# Patient Record
Sex: MALE | Race: BLACK OR AFRICAN AMERICAN | Employment: UNEMPLOYED | URBAN - METROPOLITAN AREA
[De-identification: names, ages, dates, MRNs, and addresses within clinical notes are randomized per-mention and may not be internally consistent; named-entity substitution may affect disease eponyms.]

---

## 2019-11-07 ENCOUNTER — TELEPHONE (OUTPATIENT)
Dept: ADMINISTRATIVE | Age: 69
End: 2019-11-07

## 2019-12-12 ENCOUNTER — OFFICE VISIT (OUTPATIENT)
Dept: INTERNAL MEDICINE | Age: 69
End: 2019-12-12

## 2019-12-12 ENCOUNTER — HOSPITAL ENCOUNTER (OUTPATIENT)
Age: 69
Setting detail: SPECIMEN
Discharge: HOME | End: 2019-12-12

## 2019-12-12 VITALS
WEIGHT: 181 LBS | HEART RATE: 62 BPM | SYSTOLIC BLOOD PRESSURE: 134 MMHG | DIASTOLIC BLOOD PRESSURE: 82 MMHG | BODY MASS INDEX: 26.5 KG/M2

## 2019-12-12 DIAGNOSIS — Z13.220 SCREENING FOR HYPERLIPIDEMIA: ICD-10-CM

## 2019-12-12 DIAGNOSIS — D72.819 LEUKOPENIA, UNSPECIFIED TYPE: ICD-10-CM

## 2019-12-12 DIAGNOSIS — R73.03 PREDIABETES: Primary | ICD-10-CM

## 2019-12-12 DIAGNOSIS — Z23 NEED FOR PROPHYLACTIC VACCINATION AGAINST DIPHTHERIA-TETANUS-PERTUSSIS (DTP): ICD-10-CM

## 2019-12-12 DIAGNOSIS — Z23 NEED FOR PROPHYLACTIC VACCINATION AND INOCULATION AGAINST VARICELLA: ICD-10-CM

## 2019-12-12 LAB
ABSOLUTE EOS #: <0.03 K/UL (ref 0–0.44)
ABSOLUTE IMMATURE GRANULOCYTE: <0.03 K/UL (ref 0–0.3)
ABSOLUTE LYMPH #: 0.93 K/UL (ref 1.1–3.7)
ABSOLUTE MONO #: 0.25 K/UL (ref 0.1–1.2)
ANION GAP SERPL CALCULATED.3IONS-SCNC: 11 MMOL/L (ref 9–17)
BASOPHILS # BLD: 1 % (ref 0–2)
BASOPHILS ABSOLUTE: 0.03 K/UL (ref 0–0.2)
BUN BLDV-MCNC: 12 MG/DL (ref 8–23)
BUN/CREAT BLD: ABNORMAL (ref 9–20)
CALCIUM SERPL-MCNC: 9.5 MG/DL (ref 8.6–10.4)
CHLORIDE BLD-SCNC: 100 MMOL/L (ref 98–107)
CHOLESTEROL, FASTING: 136 MG/DL
CHOLESTEROL/HDL RATIO: 3.1
CO2: 27 MMOL/L (ref 20–31)
CREAT SERPL-MCNC: 0.82 MG/DL (ref 0.7–1.2)
DIFFERENTIAL TYPE: ABNORMAL
EOSINOPHILS RELATIVE PERCENT: 1 % (ref 1–4)
GFR AFRICAN AMERICAN: >60 ML/MIN
GFR NON-AFRICAN AMERICAN: >60 ML/MIN
GFR SERPL CREATININE-BSD FRML MDRD: ABNORMAL ML/MIN/{1.73_M2}
GFR SERPL CREATININE-BSD FRML MDRD: ABNORMAL ML/MIN/{1.73_M2}
GLUCOSE BLD-MCNC: 137 MG/DL (ref 70–99)
HBA1C MFR BLD: 5.5 %
HCT VFR BLD CALC: 49 % (ref 40.7–50.3)
HDLC SERPL-MCNC: 44 MG/DL
HEMOGLOBIN: 16.7 G/DL (ref 13–17)
IMMATURE GRANULOCYTES: 0 %
LDL CHOLESTEROL: 76 MG/DL (ref 0–130)
LYMPHOCYTES # BLD: 38 % (ref 24–43)
MCH RBC QN AUTO: 30.9 PG (ref 25.2–33.5)
MCHC RBC AUTO-ENTMCNC: 34.1 G/DL (ref 28.4–34.8)
MCV RBC AUTO: 90.7 FL (ref 82.6–102.9)
MONOCYTES # BLD: 10 % (ref 3–12)
NRBC AUTOMATED: 0 PER 100 WBC
PDW BLD-RTO: 12.3 % (ref 11.8–14.4)
PLATELET # BLD: 150 K/UL (ref 138–453)
PLATELET ESTIMATE: ABNORMAL
PMV BLD AUTO: 12.9 FL (ref 8.1–13.5)
POTASSIUM SERPL-SCNC: 4.6 MMOL/L (ref 3.7–5.3)
RBC # BLD: 5.4 M/UL (ref 4.21–5.77)
RBC # BLD: ABNORMAL 10*6/UL
SEG NEUTROPHILS: 50 % (ref 36–65)
SEGMENTED NEUTROPHILS ABSOLUTE COUNT: 1.18 K/UL (ref 1.5–8.1)
SODIUM BLD-SCNC: 138 MMOL/L (ref 135–144)
TRIGLYCERIDE, FASTING: 81 MG/DL
VLDLC SERPL CALC-MCNC: NORMAL MG/DL (ref 1–30)
WBC # BLD: 2.4 K/UL (ref 3.5–11.3)
WBC # BLD: ABNORMAL 10*3/UL

## 2019-12-12 PROCEDURE — 36415 COLL VENOUS BLD VENIPUNCTURE: CPT

## 2019-12-12 PROCEDURE — 83036 HEMOGLOBIN GLYCOSYLATED A1C: CPT | Performed by: STUDENT IN AN ORGANIZED HEALTH CARE EDUCATION/TRAINING PROGRAM

## 2019-12-12 PROCEDURE — 85025 COMPLETE CBC W/AUTO DIFF WBC: CPT

## 2019-12-12 PROCEDURE — 99211 OFF/OP EST MAY X REQ PHY/QHP: CPT | Performed by: INTERNAL MEDICINE

## 2019-12-12 PROCEDURE — 99203 OFFICE O/P NEW LOW 30 MIN: CPT | Performed by: STUDENT IN AN ORGANIZED HEALTH CARE EDUCATION/TRAINING PROGRAM

## 2019-12-12 PROCEDURE — 80061 LIPID PANEL: CPT

## 2019-12-12 PROCEDURE — 80048 BASIC METABOLIC PNL TOTAL CA: CPT

## 2019-12-12 RX ORDER — ATORVASTATIN CALCIUM 40 MG/1
40 TABLET, FILM COATED ORAL DAILY
Qty: 30 TABLET | Refills: 5 | Status: SHIPPED | OUTPATIENT
Start: 2019-12-12 | End: 2020-04-30 | Stop reason: SDUPTHER

## 2020-03-12 ENCOUNTER — OFFICE VISIT (OUTPATIENT)
Dept: INTERNAL MEDICINE | Age: 70
End: 2020-03-12

## 2020-03-12 VITALS
BODY MASS INDEX: 26.36 KG/M2 | WEIGHT: 180 LBS | DIASTOLIC BLOOD PRESSURE: 72 MMHG | HEART RATE: 65 BPM | SYSTOLIC BLOOD PRESSURE: 147 MMHG

## 2020-03-12 PROBLEM — R73.03 PREDIABETES: Status: ACTIVE | Noted: 2020-03-12

## 2020-03-12 PROBLEM — D72.819 CHRONIC LEUKOPENIA: Status: ACTIVE | Noted: 2020-03-12

## 2020-03-12 PROCEDURE — 99211 OFF/OP EST MAY X REQ PHY/QHP: CPT | Performed by: INTERNAL MEDICINE

## 2020-03-12 PROCEDURE — 99213 OFFICE O/P EST LOW 20 MIN: CPT | Performed by: STUDENT IN AN ORGANIZED HEALTH CARE EDUCATION/TRAINING PROGRAM

## 2020-03-12 ASSESSMENT — PATIENT HEALTH QUESTIONNAIRE - PHQ9
2. FEELING DOWN, DEPRESSED OR HOPELESS: 0
1. LITTLE INTEREST OR PLEASURE IN DOING THINGS: 0
SUM OF ALL RESPONSES TO PHQ QUESTIONS 1-9: 0
SUM OF ALL RESPONSES TO PHQ9 QUESTIONS 1 & 2: 0
SUM OF ALL RESPONSES TO PHQ QUESTIONS 1-9: 0

## 2020-03-12 NOTE — PROGRESS NOTES
Attending Physician Statement  I have discussed the care of [facility] including pertinent history and exam findings,  with the resident. I have reviewed the key elements of all parts of the encounter with the resident. I agree with the assessment, plan and orders as documented by the resident.   (Dorothy Pierre)    Christopher Schmitz MD  Faculty Internal Medicine/ Nephrology
Diabetic visit information    BP Readings from Last 3 Encounters:   12/12/19 134/82   08/25/14 146/71   08/14/14 158/82       Hemoglobin A1C (%)   Date Value   12/12/2019 5.5   08/20/2014 6.4 (H)     LDL Cholesterol (mg/dL)   Date Value   12/12/2019 76               Have you changed or started any medications since your last visit including any over-the-counter medicines, vitamins, or herbal medicines? no   Have you stopped taking any of your medications? Is so, why? -  no  Are you having any side effects from any of your medications? - no    Have you seen any other physician or provider since your last visit?  no   Have you had any other diagnostic tests since your last visit?  no   Have you been seen in the emergency room and/or had an admission in a hospital since we last saw you?  no     Have you had your annual diabetic retinal (eye) exam? No   (ensure copy of exam is in the chart)    Have you had your routine dental cleaning in the past 6 months? no    Do you have an active MyChart account? If not, what are your barriers? No:     Patient Care Team:  Juliocesar Mckeon MD as PCP - General (Internal Medicine)  Jennifer Macias MD as Consulting Physician (Internal Medicine)    Medical history Review  Past Medical, Family, and Social History reviewed and does not contribute to the patient presenting condition.     Health Maintenance   Topic Date Due    Hepatitis C screen  1950    DTaP/Tdap/Td vaccine (1 - Tdap) 06/20/1967    Shingles Vaccine (1 of 2) 06/20/1998    Colon cancer screen colonoscopy  06/20/1998    Pneumococcal 65+ years Vaccine (1 of 1 - PPSV23) 06/20/2013    Flu vaccine (1) 09/01/2019    Lipid screen  12/12/2020    Hepatitis A vaccine  Aged Out    Hepatitis B vaccine  Aged Out    Hib vaccine  Aged Out    Meningococcal (ACWY) vaccine  Aged Out
pain  · Genitourinary:Negative for change in bladder habits, dysuria, hematuria. · Musculoskeletal: Negative for joint pain   · Neurological: Negative for headache, change in muscle strength numbness/tingling       PHYSICAL EXAM:      Vitals:    03/12/20 1543 03/12/20 1549   BP: (!) 166/69 (!) 147/72   Site: Left Upper Arm Left Upper Arm   Position: Sitting Sitting   Cuff Size: Medium Adult Medium Adult   Pulse: 66 65   Weight: 180 lb (81.6 kg)      Body mass index is 26.36 kg/m². BP Readings from Last 3 Encounters:   03/12/20 (!) 147/72   12/12/19 134/82   08/25/14 146/71        Wt Readings from Last 3 Encounters:   03/12/20 180 lb (81.6 kg)   12/12/19 181 lb (82.1 kg)   08/25/14 181 lb (82.1 kg)           · General appearance: awake, alert, cooperative  · HEENT: Head: Normocephalic, no lesions, without obvious abnormality. · Lungs: clear to auscultation bilaterally  · Heart: regular rate and rhythm, S1, S2 normal, no murmur  · Abdomen: soft, non-tender; bowel sounds normal; no masses,  no organomegaly  · Extremities: extremities normal, atraumatic, no cyanosis or edema  · Neurological:  Awake, alert, oriented to name, place and time. Cranial nerves II-XII are grossly intact. Reflexes normal and symmetric.  Sensation grossly normal  · Eye no icterus no redness    LABORATORY FINDINGS:    CBC:  Lab Results   Component Value Date    WBC 2.4 12/12/2019    HGB 16.7 12/12/2019     12/12/2019     BMP:    Lab Results   Component Value Date     12/12/2019    K 4.6 12/12/2019     12/12/2019    CO2 27 12/12/2019    BUN 12 12/12/2019    CREATININE 0.82 12/12/2019    GLUCOSE 137 12/12/2019     HEMOGLOBIN A1C:   Lab Results   Component Value Date    LABA1C 5.5 12/12/2019     MICROALBUMIN URINE: No results found for: MICROALBUR  FASTING LIPID PANEL:  Lab Results   Component Value Date    CHOL 156 08/15/2014    HDL 44 12/12/2019    TRIG 90 08/15/2014     Lab Results   Component Value Date    LDLCHOLESTEROL

## 2020-03-12 NOTE — PATIENT INSTRUCTIONS
Labs given to patient, they will have them done before their next visit. Jane Garcia     OFFICE WILL CALL PT IN AUGUST TO SCHEDULE APPOINTMENT FOR SEPT TV

## 2020-04-26 NOTE — TELEPHONE ENCOUNTER
Refill request for Metformin. If appropriate please send medication(s) to patients pharmacy. Next appt: Patient is currently on wait list for provider.     Last appt: 3/12/2020    Health Maintenance   Topic Date Due    Hepatitis C screen  1950    DTaP/Tdap/Td vaccine (1 - Tdap) 06/20/1967    Shingles Vaccine (1 of 2) 06/20/1998    Colon cancer screen colonoscopy  06/20/1998    Pneumococcal 65+ years Vaccine (1 of 1 - PPSV23) 06/20/2013    Flu vaccine (Season Ended) 09/01/2020    A1C test (Diabetic or Prediabetic)  12/12/2020    Lipid screen  12/12/2020    Hepatitis A vaccine  Aged Out    Hepatitis B vaccine  Aged Out    Hib vaccine  Aged Out    Meningococcal (ACWY) vaccine  Aged Out       Hemoglobin A1C (%)   Date Value   12/12/2019 5.5   08/20/2014 6.4 (H)             ( goal A1C is < 7)   No results found for: LABMICR  LDL Cholesterol (mg/dL)   Date Value   12/12/2019 76       (goal LDL is <100)   ALT (U/L)   Date Value   08/15/2014 14     BUN (mg/dL)   Date Value   12/12/2019 12     BP Readings from Last 3 Encounters:   03/12/20 (!) 147/72   12/12/19 134/82   08/25/14 146/71          (goal 120/80)          Patient Active Problem List:     Prediabetes     Chronic leukopenia

## 2020-04-30 NOTE — TELEPHONE ENCOUNTER
Request for Lipitor. Next Visit Date:  No future appointments.     Health Maintenance   Topic Date Due    Hepatitis C screen  1950    DTaP/Tdap/Td vaccine (1 - Tdap) 06/20/1967    Shingles Vaccine (1 of 2) 06/20/1998    Colon cancer screen colonoscopy  06/20/1998    Pneumococcal 65+ years Vaccine (1 of 1 - PPSV23) 06/20/2013    Flu vaccine (Season Ended) 09/01/2020    A1C test (Diabetic or Prediabetic)  12/12/2020    Lipid screen  12/12/2020    Hepatitis A vaccine  Aged Out    Hepatitis B vaccine  Aged Out    Hib vaccine  Aged Out    Meningococcal (ACWY) vaccine  Aged Out       Hemoglobin A1C (%)   Date Value   12/12/2019 5.5   08/20/2014 6.4 (H)             ( goal A1C is < 7)   No results found for: LABMICR  LDL Cholesterol (mg/dL)   Date Value   12/12/2019 76       (goal LDL is <100)   ALT (U/L)   Date Value   08/15/2014 14     BUN (mg/dL)   Date Value   12/12/2019 12     BP Readings from Last 3 Encounters:   03/12/20 (!) 147/72   12/12/19 134/82   08/25/14 146/71          (goal 120/80)    All Future Testing planned in [facility]  Lab Frequency Next Occurrence   Vitamin D 25 Hydroxy Once 06/20/2020   Phosphorus Once 06/20/2020   Calcium, Ionized Once 06/20/2020   Immunofixation Serum Profile Once 06/20/2020   Flow Cytometry Once 06/20/2020   CBC with Differential Once 06/20/2020         Patient Active Problem List:     Prediabetes     Chronic leukopenia

## 2020-05-11 RX ORDER — ATORVASTATIN CALCIUM 40 MG/1
40 TABLET, FILM COATED ORAL DAILY
Qty: 30 TABLET | Refills: 5 | Status: SHIPPED | OUTPATIENT
Start: 2020-05-11 | End: 2021-04-07 | Stop reason: SDUPTHER

## 2020-06-16 NOTE — TELEPHONE ENCOUNTER
Request for metformin - medication pended. Please fill if appropriate. Next Visit Date:  No future appointments.     Health Maintenance   Topic Date Due    Hepatitis C screen  1950    DTaP/Tdap/Td vaccine (1 - Tdap) 06/20/1967    Shingles Vaccine (1 of 2) 06/20/1998    Colon cancer screen colonoscopy  06/20/1998    Pneumococcal 65+ years Vaccine (1 of 1 - PPSV23) 06/20/2013    Flu vaccine (Season Ended) 09/01/2020    A1C test (Diabetic or Prediabetic)  12/12/2020    Lipid screen  12/12/2020    Hepatitis A vaccine  Aged Out    Hepatitis B vaccine  Aged Out    Hib vaccine  Aged Out    Meningococcal (ACWY) vaccine  Aged Out       Hemoglobin A1C (%)   Date Value   12/12/2019 5.5   08/20/2014 6.4 (H)             ( goal A1C is < 7)   No results found for: LABMICR  LDL Cholesterol (mg/dL)   Date Value   12/12/2019 76       (goal LDL is <100)   ALT (U/L)   Date Value   08/15/2014 14     BUN (mg/dL)   Date Value   12/12/2019 12     BP Readings from Last 3 Encounters:   03/12/20 (!) 147/72   12/12/19 134/82   08/25/14 146/71          (goal 120/80)    All Future Testing planned in [facility]  Lab Frequency Next Occurrence   Vitamin D 25 Hydroxy Once 06/20/2020   Phosphorus Once 06/20/2020   Calcium, Ionized Once 06/20/2020   Immunofixation Serum Profile Once 06/20/2020   Flow Cytometry Once 06/20/2020   CBC with Differential Once 06/20/2020         Patient Active Problem List:     Prediabetes     Chronic leukopenia

## 2020-06-29 ENCOUNTER — TELEPHONE (OUTPATIENT)
Dept: INTERNAL MEDICINE | Age: 70
End: 2020-06-29

## 2020-06-29 NOTE — LETTER
[address]  [address] [address]  Phone: [phone]  Fax: [phone]    Jocelyn Rodriguez MD        June 29, 2020 [address] [address] [address]      Dear Jemma Griffiths: We are sending this letter because your PCP ordered [facility] for you to have done at your last visit here and they have not yet been completed. If you can please come to our office on the 2nd floor to  your orders to have them compelted. If you do not have a follow-up appointment scheduled you can either contact the office to make an appointment with us or you can make one when you come in to pick-up your orders. If you have any questions or concerns, please don't hesitate to call.     Sincerely,        Jocelyn Rodriguez MD

## 2020-09-28 ENCOUNTER — HOSPITAL ENCOUNTER (OUTPATIENT)
Age: 70
Setting detail: SPECIMEN
Discharge: HOME | End: 2020-09-28

## 2020-09-28 LAB
ABSOLUTE EOS #: 0.04 K/UL (ref 0–0.44)
ABSOLUTE IMMATURE GRANULOCYTE: 0.02 K/UL (ref 0–0.3)
ABSOLUTE LYMPH #: 1 K/UL (ref 1.1–3.7)
ABSOLUTE MONO #: 0.22 K/UL (ref 0.1–1.2)
BASOPHILS # BLD: 1 % (ref 0–2)
BASOPHILS ABSOLUTE: 0.02 K/UL (ref 0–0.2)
CALCIUM IONIZED: 1.26 MMOL/L (ref 1.13–1.33)
DIFFERENTIAL TYPE: ABNORMAL
EOSINOPHILS RELATIVE PERCENT: 2 % (ref 1–4)
HCT VFR BLD CALC: 45.9 % (ref 40.7–50.3)
HEMOGLOBIN: 15.4 G/DL (ref 13–17)
IMMATURE GRANULOCYTES: 1 %
LYMPHOCYTES # BLD: 45 % (ref 24–43)
MCH RBC QN AUTO: 30.6 PG (ref 25.2–33.5)
MCHC RBC AUTO-ENTMCNC: 33.6 G/DL (ref 28.4–34.8)
MCV RBC AUTO: 91.1 FL (ref 82.6–102.9)
MONOCYTES # BLD: 10 % (ref 3–12)
MORPHOLOGY: NORMAL
NRBC AUTOMATED: 0 PER 100 WBC
PDW BLD-RTO: 13.1 % (ref 11.8–14.4)
PHOSPHORUS: 2.8 MG/DL (ref 2.5–4.5)
PLATELET # BLD: 149 K/UL (ref 138–453)
PLATELET ESTIMATE: ABNORMAL
PMV BLD AUTO: 12.5 FL (ref 8.1–13.5)
RBC # BLD: 5.04 M/UL (ref 4.21–5.77)
RBC # BLD: ABNORMAL 10*6/UL
SEG NEUTROPHILS: 41 % (ref 36–65)
SEGMENTED NEUTROPHILS ABSOLUTE COUNT: 0.9 K/UL (ref 1.5–8.1)
VITAMIN D 25-HYDROXY: 21.3 NG/ML (ref 30–100)
WBC # BLD: 2.2 K/UL (ref 3.5–11.3)
WBC # BLD: ABNORMAL 10*3/UL

## 2020-09-29 LAB
PATHOLOGIST: NORMAL
SERUM IFX INTERP: NORMAL

## 2020-10-01 LAB — SURGICAL PATHOLOGY REPORT: NORMAL

## 2020-10-02 LAB — FLOW CYTOMETRY BL: NORMAL

## 2020-10-14 ENCOUNTER — OFFICE VISIT (OUTPATIENT)
Dept: INTERNAL MEDICINE | Age: 70
End: 2020-10-14

## 2020-10-14 VITALS
WEIGHT: 181 LBS | BODY MASS INDEX: 26.5 KG/M2 | DIASTOLIC BLOOD PRESSURE: 82 MMHG | HEART RATE: 78 BPM | SYSTOLIC BLOOD PRESSURE: 174 MMHG

## 2020-10-14 PROCEDURE — 99211 OFF/OP EST MAY X REQ PHY/QHP: CPT | Performed by: INTERNAL MEDICINE

## 2020-10-14 PROCEDURE — 99213 OFFICE O/P EST LOW 20 MIN: CPT | Performed by: STUDENT IN AN ORGANIZED HEALTH CARE EDUCATION/TRAINING PROGRAM

## 2020-10-14 RX ORDER — LISINOPRIL 10 MG/1
10 TABLET ORAL DAILY
Qty: 30 TABLET | Refills: 2 | Status: SHIPPED
Start: 2020-10-14 | End: 2021-02-17 | Stop reason: SINTOL

## 2020-10-14 RX ORDER — MELATONIN
1000 DAILY
Qty: 90 TABLET | Refills: 1 | Status: SHIPPED | OUTPATIENT
Start: 2020-10-14 | End: 2021-04-07 | Stop reason: SDUPTHER

## 2020-10-14 NOTE — PROGRESS NOTES
@[facility]@    [facility]    Progress Note    Date of patient's visit: 10/14/2020    Patient's Name:  Kelton Pan    YOB: 1950            Patient Care Team:  Bronson Todd MD as PCP - General (Internal Medicine)  Ronda Smith MD as Consulting Physician (Internal Medicine)    REASON FOR VISIT: Routine outpatient follow     Chief Complaint   Patient presents with   [facility] Maintenance     flu shot given at [facility] / tdap p[place] shingles set to print no insurance          HISTORY OF PRESENT ILLNESS:    History was obtained from the patient. Kelton Pan is a 65 y.o. is here for clinic follow-up. History of prediabetes with last A1c 5.5 on metformin 100 mg once daily, atorvastatin 40 mg daily came for routine follow-up. Patient has leukopenia with last WBC count is 2.2, peripheral blood smear showed moderate neutropenia, low absolute lymphocyte count, flow cytometry was negative, lymphocyte 55%, 10% monocytes, 35% granulocytes/myeloid cells and cytometry was negative for any B-cell monoclonality or T-cell aberrancy. Patient blood pressure today is elevated systolic 480 and diastolic 80. Will recheck it again. Patient checks his blood pressure at home usually runs between systolic 815 to 806T. Advised the patient to recheck his blood pressure at home and call me back in 2 weeks    Patient found to have low vitamin D level of 21.3. Past Medical History:   Diagnosis Date    Depression        No past surgical history on file.       ALLERGIES    No Known Allergies    MEDICATIONS:      Current Outpatient Medications on File Prior to Visit   Medication Sig Dispense Refill    metFORMIN (GLUCOPHAGE) 500 MG tablet TAKE ONE TABLET BY MOUTH DAILY WITH BREAKFAST 90 tablet 0    atorvastatin (LIPITOR) 40 MG tablet Take 1 tablet by mouth daily 30 tablet 5     No current facility-administered medications on file prior to visit. SOCIAL HISTORY    Reviewed and no change from previous record. July Daley  reports that he has never smoked. He has never used smokeless tobacco.    FAMILY HISTORY:    Reviewed and No change from previous visit    HEALTH MAINTENANCE DUE:      Health Maintenance Due   Topic Date Due    Hepatitis C screen  1950    DTaP/Tdap/Td vaccine (1 - Tdap) 06/20/1967    Shingles Vaccine (1 of 2) 06/20/1998    Colon cancer screen colonoscopy  06/20/1998    Pneumococcal 65+ years Vaccine (1 of 1 - PPSV23) 06/20/2013       REVIEW OF SYSTEMS:    12 point review of symptoms completed and found to be normal except noted in the HPI    · Constitutional: Negative for Fever, chills  · Eyes: Negative for visual changes, diplopia  · ENT: Negative for mouth sores, sore throat. · Cardiovascular: Negative for lightheadedness ,chest pain, palpitations   · Respiratory:Negative for Shortness of breath,cough or wheezing. · Gastrointestinal: Negative for nausea/vomiting, change in bowel habits, abdominal pain  · Genitourinary:Negative for change in bladder habits, dysuria, hematuria. · Musculoskeletal: Negative for joint pain   · Neurological: Negative for headache, change in muscle strength numbness/tingling       PHYSICAL EXAM:      Vitals:    10/14/20 1051   BP: (!) 176/80   Site: Left Upper Arm   Position: Sitting   Cuff Size: Medium Adult   Pulse: 75   Weight: 181 lb (82.1 kg)     Body mass index is 26.5 kg/m². BP Readings from Last 3 Encounters:   10/14/20 (!) 176/80   03/12/20 (!) 147/72   12/12/19 134/82        Wt Readings from Last 3 Encounters:   10/14/20 181 lb (82.1 kg)   03/12/20 180 lb (81.6 kg)   12/12/19 181 lb (82.1 kg)           · General appearance: awake, alert, cooperative  · HEENT: Head: Normocephalic, no lesions, without obvious abnormality.   · Lungs: clear to auscultation bilaterally  · Heart: regular rate and rhythm, S1, S2 normal, no murmur  · Abdomen: soft, non-tender; bowel sounds normal; no masses,  no organomegaly  · Extremities: extremities normal, atraumatic, no cyanosis or edema  · Neurological:  Awake, alert, oriented to name, place and time. Cranial nerves II-XII are grossly intact. Reflexes normal and symmetric.  Sensation grossly normal  · Eye no icterus no redness    LABORATORY FINDINGS:    CBC:  Lab Results   Component Value Date    WBC 2.2 09/28/2020    HGB 15.4 09/28/2020     09/28/2020     BMP:    Lab Results   Component Value Date     12/12/2019    K 4.6 12/12/2019     12/12/2019    CO2 27 12/12/2019    BUN 12 12/12/2019    CREATININE 0.82 12/12/2019    GLUCOSE 137 12/12/2019     HEMOGLOBIN A1C:   Lab Results   Component Value Date    LABA1C 5.5 12/12/2019     MICROALBUMIN URINE: No results found for: MICROALBUR  FASTING LIPID PANEL:  Lab Results   Component Value Date    CHOL 156 08/15/2014    HDL 44 12/12/2019    TRIG 90 08/15/2014     Lab Results   Component Value Date    LDLCHOLESTEROL 76 12/12/2019       LIVER PROFILE:  Lab Results   Component Value Date    ALT 14 08/15/2014      THYROID FUNCTION:   Lab Results   Component Value Date    TSH 1.68 08/15/2014      URINE ANALYSIS: No results found for: LABURIN  ASSESSMENT AND PLAN:      Prediabetes  Continue metformin 500 mg daily  Continue atorvastatin 40 mg daily  Patient last A1c is 5.512/19    Elevated blood pressure  Blood pressure today systolic 349 and diastolic 80  We will recheck the blood pressure  Patient checks his home blood pressure monitoring usually runs between systolic 990 and diastolic 322  Advised the patient to recheck it for 2 weeks and call me back in 2 weeks  Will start if it persists blood pressure is elevated at his home      Need for prophylactic vaccination against Streptococcus pneumoniae (pneumococcus)    - Pneumococcal polysaccharide vaccine 23-valent PPSV23    Need for prophylactic vaccination against diphtheria-tetanus-pertussis (DTP)    - Tdap (ADACEL) 5-2-15.5 LF-MCG/0.5 injection; Inject 0.5 mLs into the muscle once for 1 dose  Dispense: 0.5 mL; Refill: 0     Need for prophylactic vaccination and inoculation against varicella    - zoster recombinant adjuvanted vaccine [facility]) 50 MCG/0.5ML SUSR injection; Inject 0.5 mLs into the muscle once for 1 dose 50 MCG IM then repeat 2-6 months. Dispense: 1 each; Refill: 1     Hypovitaminosis D    - vitamin D3 (CHOLECALCIFEROL) 25 MCG (1000 UT) TABS tablet; Take 1 tablet by mouth daily  Dispense: 90 tablet; Refill: 1          Health Maintenance      FOLLOW UP AND INSTRUCTIONS:   1. No follow-ups on file. 2. Chuck received counseling on the following healthy behaviors: exercise    3. Reviewed prior labs and health maintenance. 4. Discussed use, benefit, and side effects of prescribed medications. Barriers to medication compliance addressed. All patient questions answered. Pt voiced understanding.      5. Patient given educational materials - see patient instructions            Vivek Angel MD,  Internal Medicine Resident, PGY-3,   [address]  10/14/2020,11:22 AM

## 2020-10-14 NOTE — PATIENT INSTRUCTIONS
office will call pt in December to st up January appointment     Printed script for tdap shingles vaccines given to pt    v

## 2020-10-21 ENCOUNTER — HOSPITAL ENCOUNTER (OUTPATIENT)
Age: 70
Setting detail: SPECIMEN
Discharge: HOME | End: 2020-10-21

## 2020-10-21 LAB
ANION GAP SERPL CALCULATED.3IONS-SCNC: 12 MMOL/L (ref 9–17)
BUN BLDV-MCNC: 17 MG/DL (ref 8–23)
BUN/CREAT BLD: ABNORMAL (ref 9–20)
CALCIUM SERPL-MCNC: 9.3 MG/DL (ref 8.6–10.4)
CHLORIDE BLD-SCNC: 103 MMOL/L (ref 98–107)
CO2: 23 MMOL/L (ref 20–31)
CREAT SERPL-MCNC: 0.88 MG/DL (ref 0.7–1.2)
ESTIMATED AVERAGE GLUCOSE: 108 MG/DL
GFR AFRICAN AMERICAN: >60 ML/MIN
GFR NON-AFRICAN AMERICAN: >60 ML/MIN
GFR SERPL CREATININE-BSD FRML MDRD: ABNORMAL ML/MIN/{1.73_M2}
GFR SERPL CREATININE-BSD FRML MDRD: ABNORMAL ML/MIN/{1.73_M2}
GLUCOSE BLD-MCNC: 155 MG/DL (ref 70–99)
HBA1C MFR BLD: 5.4 % (ref 4–6)
POTASSIUM SERPL-SCNC: 4.4 MMOL/L (ref 3.7–5.3)
SODIUM BLD-SCNC: 138 MMOL/L (ref 135–144)

## 2021-02-17 ENCOUNTER — VIRTUAL VISIT (OUTPATIENT)
Dept: INTERNAL MEDICINE | Age: 71
End: 2021-02-17

## 2021-02-17 DIAGNOSIS — I10 ESSENTIAL HYPERTENSION: Primary | ICD-10-CM

## 2021-02-17 PROCEDURE — 99442 PR PHYS/QHP TELEPHONE EVALUATION 11-20 MIN: CPT | Performed by: INTERNAL MEDICINE

## 2021-02-17 RX ORDER — LISINOPRIL 2.5 MG/1
2.5 TABLET ORAL DAILY
Qty: 30 TABLET | Refills: 0 | Status: SHIPPED | OUTPATIENT
Start: 2021-02-17 | End: 2021-04-07 | Stop reason: SDUPTHER

## 2021-02-17 RX ORDER — LISINOPRIL 2.5 MG/1
10 TABLET ORAL DAILY
Qty: 30 TABLET | Refills: 0 | Status: CANCELLED | OUTPATIENT
Start: 2021-02-17

## 2021-02-17 SDOH — ECONOMIC STABILITY: FOOD INSECURITY: WITHIN THE PAST 12 MONTHS, YOU WORRIED THAT YOUR FOOD WOULD RUN OUT BEFORE YOU GOT MONEY TO BUY MORE.: NEVER TRUE

## 2021-02-17 SDOH — ECONOMIC STABILITY: TRANSPORTATION INSECURITY
IN THE PAST 12 MONTHS, HAS LACK OF TRANSPORTATION KEPT YOU FROM MEETINGS, WORK, OR FROM GETTING THINGS NEEDED FOR DAILY LIVING?: NO

## 2021-02-17 SDOH — ECONOMIC STABILITY: FOOD INSECURITY: WITHIN THE PAST 12 MONTHS, THE FOOD YOU BOUGHT JUST DIDN'T LAST AND YOU DIDN'T HAVE MONEY TO GET MORE.: NEVER TRUE

## 2021-02-17 SDOH — ECONOMIC STABILITY: INCOME INSECURITY: HOW HARD IS IT FOR YOU TO PAY FOR THE VERY BASICS LIKE FOOD, HOUSING, MEDICAL CARE, AND HEATING?: NOT VERY HARD

## 2021-02-17 ASSESSMENT — PATIENT HEALTH QUESTIONNAIRE - PHQ9
1. LITTLE INTEREST OR PLEASURE IN DOING THINGS: 0
SUM OF ALL RESPONSES TO PHQ QUESTIONS 1-9: 0
2. FEELING DOWN, DEPRESSED OR HOPELESS: 0

## 2021-02-17 NOTE — PROGRESS NOTES
Attending Physician Statement  I have discussed the care of [facility], including pertinent history and exam findings with the resident. I have reviewed the key elements of all parts of the encounter with the resident. I agree with the assessment, and status of the problem list as documented. Diagnosis Orders   1. Essential hypertension  lisinopril (PRINIVIL;ZESTRIL) 2.5 MG tablet     The plan and orders should include No orders of the defined types were placed in this encounter. and this was also documented by the resident. The medication list was reviewed with the resident and is up to date. The return visit should be in 3 months .     Dr Sabino Eubanks MD, [address]  Associate , Department of Internal Medicine  Resident Ambulatory Site Medical Director  [address] [facility] Internal Medicine  [facility] AT [place]  Internal Medicine Clerkship - Prabhjot Lindo    2/17/2021, 2:11 PM
I affirm this is a Patient Initiated Episode with a Patient who has not had a related appointment within my department in the past 7 days or scheduled within the next 24 hours.     Patient identification was verified at the start of the visit: Yes    Total Time:10-15 min    Note: not billable if this call serves to triage the patient into an appointment for the relevant concern      Gloria Gerber

## 2021-02-17 NOTE — PATIENT INSTRUCTIONS
Follow-up appointment scheduled for 3/17/21 at [facility] LORETO given to patient. Medications e-scribe to pharmacy of pt's choice.      enrico

## 2021-02-17 NOTE — TELEPHONE ENCOUNTER
Pt requesting medication refill, metformin  Next Visit Date:2/17/21  Future Appointments   Date Time Provider Blair Bowmani   3/17/2021  9:00 AM Sarah Gomez MD [address] [facility]   Topic Date Due    Hepatitis C screen  1950    DTaP/Tdap/Td vaccine (1 - Tdap) 06/20/1967    Shingles Vaccine (1 of 2) 06/20/1998    Colon cancer screen colonoscopy  06/20/1998    Pneumococcal 65+ years Vaccine (1 of 1 - PPSV23) 06/20/2013    Lipid screen  12/12/2020    COVID-19 Vaccine (2 of 2 - Pfizer series) 03/06/2021    A1C test (Diabetic or Prediabetic)  10/21/2021    Potassium monitoring  10/21/2021    Creatinine monitoring  10/21/2021    Flu vaccine  Completed    Hepatitis A vaccine  Aged Out    Hepatitis B vaccine  Aged Out    Hib vaccine  Aged Out    Meningococcal (ACWY) vaccine  Aged Out       Hemoglobin A1C (%)   Date Value   10/21/2020 5.4   12/12/2019 5.5   08/20/2014 6.4 (H)             ( goal A1C is < 7)   No results found for: LABMICR  LDL Cholesterol (mg/dL)   Date Value   12/12/2019 76   08/15/2014 93       (goal LDL is <100)   ALT (U/L)   Date Value   08/15/2014 14     BUN (mg/dL)   Date Value   10/21/2020 17     BP Readings from Last 3 Encounters:   10/14/20 (!) 174/82   03/12/20 (!) 147/72   12/12/19 134/82          (goal 120/80)    All Future Testing planned in [facility]  Lab Frequency Next Occurrence               Patient Active Problem List:     Prediabetes     Chronic leukopenia

## 2021-03-16 ENCOUNTER — TELEPHONE (OUTPATIENT)
Dept: INTERNAL MEDICINE | Age: 71
End: 2021-03-16

## 2021-03-16 NOTE — TELEPHONE ENCOUNTER
PT has an appointment on 4/7/21 at [facility] and will need an  Turkish or [address]. Appointment was originally scheduled for 3/17/21. Thank you.

## 2021-04-07 ENCOUNTER — OFFICE VISIT (OUTPATIENT)
Dept: INTERNAL MEDICINE | Age: 71
End: 2021-04-07

## 2021-04-07 VITALS
HEIGHT: 69 IN | SYSTOLIC BLOOD PRESSURE: 135 MMHG | WEIGHT: 170 LBS | HEART RATE: 57 BPM | BODY MASS INDEX: 25.18 KG/M2 | DIASTOLIC BLOOD PRESSURE: 71 MMHG

## 2021-04-07 DIAGNOSIS — R73.03 PREDIABETES: ICD-10-CM

## 2021-04-07 DIAGNOSIS — E55.9 HYPOVITAMINOSIS D: ICD-10-CM

## 2021-04-07 DIAGNOSIS — I10 ESSENTIAL HYPERTENSION: Primary | ICD-10-CM

## 2021-04-07 PROCEDURE — 99211 OFF/OP EST MAY X REQ PHY/QHP: CPT | Performed by: INTERNAL MEDICINE

## 2021-04-07 PROCEDURE — 99213 OFFICE O/P EST LOW 20 MIN: CPT | Performed by: STUDENT IN AN ORGANIZED HEALTH CARE EDUCATION/TRAINING PROGRAM

## 2021-04-07 RX ORDER — ATORVASTATIN CALCIUM 40 MG/1
40 TABLET, FILM COATED ORAL DAILY
Qty: 90 TABLET | Refills: 3 | Status: SHIPPED | OUTPATIENT
Start: 2021-04-07

## 2021-04-07 RX ORDER — MELATONIN
1000 DAILY
Qty: 90 TABLET | Refills: 3 | Status: SHIPPED | OUTPATIENT
Start: 2021-04-07

## 2021-04-07 RX ORDER — LISINOPRIL 2.5 MG/1
2.5 TABLET ORAL DAILY
Qty: 90 TABLET | Refills: 1 | Status: SHIPPED | OUTPATIENT
Start: 2021-04-07

## 2021-04-07 NOTE — PATIENT INSTRUCTIONS
-Pt due for 6 month f/u in October-- pt to call in September to set up an appt--reminder in [facility] to contact patient as well--AVS given to patient    -Metformin dc'ed at pharmacy     -DAVI Menard

## 2021-04-07 NOTE — PROGRESS NOTES
@[facility]@    [facility]    Progress Note    Date of patient's visit: 4/7/2021    Patient's Name:  Miguel Cadet    YOB: 1950            Patient Care Team:  Regi Rehman MD as PCP - General (Internal Medicine)  Fabian Coates MD as Consulting Physician (Internal Medicine)    REASON FOR VISIT: Routine outpatient follow     Chief Complaint   Patient presents with    Hypertension     BP Check     Health Maintenance         HISTORY OF PRESENT ILLNESS:    History was obtained from the patient. Miguel Cadet is a 65 y.o. is here for follow-up for hypertension. A 58-year-old male with past medical history of prediabetes with last A1c 5.4 on 10/21/2020 on Metformin 500 mg once daily, on atorvastatin 40 mg daily with last LDL 76 on 12/12/2019, hypertension on lisinopril 2.5 mg once daily, benign ethnic neutropenia/leukopenia evaluated in the past with negative flow cytometry and immunofixation serum profile is here for follow-up for blood pressure. Patient initial blood pressure was 1 6170 5 repeat blood pressure was systolic 598 and diastolic 71 and pulse 57. Patient is not complaining of dizzy symptoms. Patient is taking his medication regularly. No other symptoms.     Serum creatinine 0.88 on 10/21/2020  Patient last WBC is 2.2 on 9/28/2020, moderate neutropenia with neutrophil Count 901/mm³      Past Medical History:   Diagnosis Date    Depression        No past surgical history on file.       ALLERGIES    No Known Allergies    MEDICATIONS:      Current Outpatient Medications on File Prior to Visit   Medication Sig Dispense Refill    lisinopril (PRINIVIL;ZESTRIL) 2.5 MG tablet Take 1 tablet by mouth daily 30 tablet 0    metFORMIN (GLUCOPHAGE) 500 MG tablet Take 1 tablet by mouth daily (with breakfast) 90 tablet 3    vitamin D3 (CHOLECALCIFEROL) 25 MCG (1000 UT) TABS tablet Take 1 tablet by mouth daily 90 tablet 1    normal, no murmur  · Abdomen: soft, non-tender; bowel sounds normal; no masses,  no organomegaly  · Extremities: extremities normal, atraumatic, no cyanosis or edema  · Neurological: Monofilament test was done in the dorsal and plantar aspect of both feet, no peripheral neuropathy. .  · Eye no icterus no redness    LABORATORY FINDINGS:    CBC:  Lab Results   Component Value Date    WBC 2.2 09/28/2020    HGB 15.4 09/28/2020     09/28/2020     BMP:    Lab Results   Component Value Date     10/21/2020    K 4.4 10/21/2020     10/21/2020    CO2 23 10/21/2020    BUN 17 10/21/2020    CREATININE 0.88 10/21/2020    GLUCOSE 155 10/21/2020     HEMOGLOBIN A1C:   Lab Results   Component Value Date    LABA1C 5.4 10/21/2020     MICROALBUMIN URINE: No results found for: MICROALBUR  FASTING LIPID PANEL:  Lab Results   Component Value Date    CHOL 156 08/15/2014    HDL 44 12/12/2019    TRIG 90 08/15/2014     Lab Results   Component Value Date    LDLCHOLESTEROL 76 12/12/2019       LIVER PROFILE:  Lab Results   Component Value Date    ALT 14 08/15/2014      THYROID FUNCTION:   Lab Results   Component Value Date    TSH 1.68 08/15/2014      URINE ANALYSIS: No results found for: LABURIN  ASSESSMENT AND PLAN:    There are no diagnoses linked to this encounter. Essential hypertension  Continue lisinopril 2.5 mg once daily  Educated regarding the salt restriction    Prediabetes  Last A1c of 5.4 on 10/20  Will discontinue Metformin  Educated the patient  Monofilament test shows no peripheral neuropathy  Advised the patient to see eye doctor, has a history of cataract    All refills given. Patient has insurance issues, unable to do any routine screening. Health Maintenance      FOLLOW UP AND INSTRUCTIONS:   1. No follow-ups on file. 2. Chuck received counseling on the following healthy behaviors: medication adherence    3. Reviewed prior labs and health maintenance.       4. Discussed use, benefit, and side effects of prescribed medications. Barriers to medication compliance addressed. All patient questions answered. Pt voiced understanding.      5. Patient given educational materials - see patient instructions         Kristen Parikh MD,  Internal Medicine Resident, PGY-3,   [address]  4/7/2021,9:48 AM

## 2022-02-02 ENCOUNTER — TELEPHONE (OUTPATIENT)
Dept: INTERNAL MEDICINE | Age: 72
End: 2022-02-02

## 2022-02-02 NOTE — TELEPHONE ENCOUNTER
Pt. daughter is calling to see if there is a test to see if her dad has the  Covid antibody. after catching it   Please Advise

## 2023-04-07 PROBLEM — E78.5 HYPERLIPIDEMIA: Status: ACTIVE | Noted: 2023-04-07

## 2023-04-07 PROBLEM — M72.2 PLANTAR FASCIITIS OF RIGHT FOOT: Status: ACTIVE | Noted: 2023-04-07

## 2023-04-07 PROBLEM — E11.9 TYPE 2 DIABETES MELLITUS WITHOUT COMPLICATION, WITHOUT LONG-TERM CURRENT USE OF INSULIN (HCC): Status: ACTIVE | Noted: 2020-03-12

## 2023-04-07 PROBLEM — I10 ESSENTIAL HYPERTENSION: Status: ACTIVE | Noted: 2023-04-07

## 2023-05-12 ENCOUNTER — OFFICE VISIT (OUTPATIENT)
Dept: INTERNAL MEDICINE | Age: 73
End: 2023-05-12

## 2023-05-12 VITALS
SYSTOLIC BLOOD PRESSURE: 142 MMHG | BODY MASS INDEX: 25.28 KG/M2 | WEIGHT: 176.6 LBS | TEMPERATURE: 97.9 F | OXYGEN SATURATION: 98 % | HEART RATE: 71 BPM | DIASTOLIC BLOOD PRESSURE: 70 MMHG | HEIGHT: 70 IN

## 2023-05-12 DIAGNOSIS — I10 ESSENTIAL HYPERTENSION: Primary | ICD-10-CM

## 2023-05-12 DIAGNOSIS — E11.9 TYPE 2 DIABETES MELLITUS WITHOUT COMPLICATION, WITHOUT LONG-TERM CURRENT USE OF INSULIN (HCC): ICD-10-CM

## 2023-05-12 DIAGNOSIS — E78.5 HYPERLIPIDEMIA, UNSPECIFIED HYPERLIPIDEMIA TYPE: ICD-10-CM

## 2023-05-12 LAB
CHP ED QC CHECK: YES
GLUCOSE BLD-MCNC: 144 MG/DL

## 2023-05-12 PROCEDURE — 3077F SYST BP >= 140 MM HG: CPT

## 2023-05-12 PROCEDURE — 3044F HG A1C LEVEL LT 7.0%: CPT

## 2023-05-12 PROCEDURE — 1123F ACP DISCUSS/DSCN MKR DOCD: CPT

## 2023-05-12 PROCEDURE — 3078F DIAST BP <80 MM HG: CPT

## 2023-05-12 PROCEDURE — 99213 OFFICE O/P EST LOW 20 MIN: CPT

## 2023-05-12 RX ORDER — LISINOPRIL 2.5 MG/1
2.5 TABLET ORAL DAILY
Qty: 30 TABLET | Refills: 5 | Status: SHIPPED | OUTPATIENT
Start: 2023-05-12

## 2023-05-12 RX ORDER — BLOOD PRESSURE TEST KIT-WRIST
KIT MISCELLANEOUS
Qty: 1 EACH | Refills: 0 | Status: SHIPPED | OUTPATIENT
Start: 2023-05-12

## 2023-05-12 RX ORDER — ATORVASTATIN CALCIUM 20 MG/1
20 TABLET, FILM COATED ORAL DAILY
Qty: 30 TABLET | Refills: 5 | Status: SHIPPED | OUTPATIENT
Start: 2023-05-12

## 2023-05-12 ASSESSMENT — ENCOUNTER SYMPTOMS
COUGH: 0
ABDOMINAL PAIN: 0
CHEST TIGHTNESS: 0
ABDOMINAL DISTENTION: 0
STRIDOR: 0
DIARRHEA: 0
WHEEZING: 0
NAUSEA: 0

## 2023-05-12 NOTE — PROGRESS NOTES
[facility]  [facility] [address] [address]  Dept: [phone]  Dept Fax: [phone]    Office Progress/Follow Up Note  Date of patient's visit: 5/12/2023  Patient's Name:  Alli Lamas YOB: 1950            Patient Care Team:  Thiago Cruz MD as PCP - General (Internal Medicine)  Isael Law MD as Consulting Physician (Internal Medicine)  ________________________________________________________________________      Reason for Visit: Routine outpatient follow up diabetes mellitus and hypertension  ________________________________________________________________________  Chief Complaint:  Diabetes (Pt took medication today, pt want to discuss lorsartan) and Hypertension    ________________________________________________________________________  History of Presenting Illness:  History was obtained from: patient, electronic medical record. Alli Lamas is a 74 y.o. is here for:    Hypertension follow-up. Diabetes follow-up    Patient was recently seen in the office and found to have elevated blood sugar  and high readings at home. His POC A1c was 5.9 and lab A1c was 6.0. His fasting blood sugar at home remain 1 20-1 30s. He denies any symptoms, polyuria or polydipsia. And currently takes metformin 500 mg twice daily. He was started on losartan 25 mg daily but patient has noticed dizziness and stopped taking it. He started taking lisinopril, dose unknown. Patient mentioned that he tolerated well lisinopril and would like to go back on lisinopril instead of losartan. Other than that, patient does not have any symptoms and has been doing pretty well. Initial blood pressure reading was 170/83 but subsequent reading is 142/70. Similar kind of readings were noted in the previous visit consistent with whitecoat hypertension.       Patient Active Problem List   Diagnosis    Type 2 diabetes mellitus without

## 2023-05-12 NOTE — PROGRESS NOTES
Attending Physician Statement  I have discussed the care of [facility], including pertinent history and exam findings,  with the resident. I have reviewed the key elements of all parts of the encounter with the resident. I agree with the assessment, plan and orders as documented by the resident.   (GE Modifier)

## 2023-05-23 ENCOUNTER — OFFICE VISIT (OUTPATIENT)
Dept: INTERNAL MEDICINE | Age: 73
End: 2023-05-23

## 2023-05-23 VITALS
DIASTOLIC BLOOD PRESSURE: 70 MMHG | TEMPERATURE: 99.1 F | BODY MASS INDEX: 25.6 KG/M2 | OXYGEN SATURATION: 100 % | HEIGHT: 70 IN | HEART RATE: 74 BPM | SYSTOLIC BLOOD PRESSURE: 140 MMHG | WEIGHT: 178.8 LBS

## 2023-05-23 DIAGNOSIS — H93.233 HYPERACUSIS OF BOTH EARS: Primary | ICD-10-CM

## 2023-05-23 DIAGNOSIS — H93.13 BILATERAL TINNITUS: ICD-10-CM

## 2023-05-23 PROCEDURE — 99213 OFFICE O/P EST LOW 20 MIN: CPT | Performed by: INTERNAL MEDICINE

## 2023-05-23 PROCEDURE — 3078F DIAST BP <80 MM HG: CPT | Performed by: INTERNAL MEDICINE

## 2023-05-23 PROCEDURE — 3077F SYST BP >= 140 MM HG: CPT | Performed by: INTERNAL MEDICINE

## 2023-05-23 PROCEDURE — 1123F ACP DISCUSS/DSCN MKR DOCD: CPT | Performed by: INTERNAL MEDICINE

## 2023-05-23 RX ORDER — LOSARTAN POTASSIUM 25 MG/1
25 TABLET ORAL DAILY
COMMUNITY
End: 2023-05-23 | Stop reason: CLARIF

## 2023-05-23 RX ORDER — AMITRIPTYLINE HYDROCHLORIDE 10 MG/1
10 TABLET, FILM COATED ORAL NIGHTLY
Qty: 30 TABLET | Refills: 0 | Status: SHIPPED | OUTPATIENT
Start: 2023-05-23

## 2023-05-23 ASSESSMENT — ENCOUNTER SYMPTOMS
ALLERGIC/IMMUNOLOGIC NEGATIVE: 1
RESPIRATORY NEGATIVE: 1
EYES NEGATIVE: 1
GASTROINTESTINAL NEGATIVE: 1

## 2023-05-23 NOTE — PROGRESS NOTES
[facility]   Progress Note        Date of patient's visit: 5/23/2023  Patient's Name:  Stephanie Alejandre                   YOB: 1950        PCP:  MD Timothy Dunn Hui Mccarty is a 74 y.o. male who presents for No chief complaint on file. and follow up of chronic medical problems. Patient Active Problem List   Diagnosis    Type 2 diabetes mellitus without complication, without long-term current use of insulin (HCC)    Chronic leukopenia    Hyperlipidemia    Plantar fasciitis of right foot    Essential hypertension       HISTORY OF PRESENT ILLNESS:    History was obtained from the patient. Hearing sensitivity and tinnitus  Patient presents today with complaints of hearing issues. Concern regarding hearing has been present for 3 weeks. He has not failed a prior hearing test.  The patient reports starting losartan and noticing noises and wind causing sensitivity to ears and a constant ringing. There is not a history of otitis media. There is not a history of recent URI, dizziness, sinus or allergy issues. Patient's allergies, medications, past medical, surgical, social and family histories were reviewed and updated as appropriate.     ALLERGIES    No Known Allergies      MEDICATIONS:      Current Outpatient Medications   Medication Sig Dispense Refill    atorvastatin (LIPITOR) 20 MG tablet Take 1 tablet by mouth daily 30 tablet 5    lisinopril (PRINIVIL;ZESTRIL) 2.5 MG tablet Take 1 tablet by mouth daily 30 tablet 5    metFORMIN (GLUCOPHAGE) 500 MG tablet Take 2 tablets by mouth 2 times daily (with meals) 60 tablet 3    Blood Pressure Monitoring (3 SERIES BP MONITOR/WRIST) JACINTO Check BP 2/day 1 each 0    vitamin D3 (CHOLECALCIFEROL) 25 MCG (1000 UT) TABS tablet Take 1 tablet by mouth daily 90 tablet 3    ibuprofen (ADVIL;MOTRIN) 600 MG tablet Take 1 tablet by mouth every 8 hours as needed for Pain or Fever 60 tablet      No current facility-administered

## 2023-05-30 DIAGNOSIS — E11.9 TYPE 2 DIABETES MELLITUS WITHOUT COMPLICATION, WITHOUT LONG-TERM CURRENT USE OF INSULIN (HCC): ICD-10-CM

## 2023-05-30 NOTE — TELEPHONE ENCOUNTER
Received fax request from pharmacy that insurance company is requesting 90 day supply of Metformin. Medication pended.

## 2023-06-09 ENCOUNTER — OFFICE VISIT (OUTPATIENT)
Dept: INTERNAL MEDICINE | Age: 73
End: 2023-06-09

## 2023-06-09 VITALS
HEART RATE: 67 BPM | BODY MASS INDEX: 25.45 KG/M2 | WEIGHT: 177.8 LBS | TEMPERATURE: 97.3 F | HEIGHT: 70 IN | DIASTOLIC BLOOD PRESSURE: 60 MMHG | OXYGEN SATURATION: 99 % | SYSTOLIC BLOOD PRESSURE: 134 MMHG

## 2023-06-09 DIAGNOSIS — Z23 NEED FOR PNEUMOCOCCAL VACCINE: ICD-10-CM

## 2023-06-09 DIAGNOSIS — I10 ESSENTIAL HYPERTENSION: ICD-10-CM

## 2023-06-09 DIAGNOSIS — Z12.11 COLON CANCER SCREENING: ICD-10-CM

## 2023-06-09 DIAGNOSIS — E11.9 TYPE 2 DIABETES MELLITUS WITHOUT COMPLICATION, WITHOUT LONG-TERM CURRENT USE OF INSULIN (HCC): Primary | ICD-10-CM

## 2023-06-09 LAB
CHP ED QC CHECK: ABNORMAL
GLUCOSE BLD-MCNC: 150 MG/DL

## 2023-06-09 PROCEDURE — 1123F ACP DISCUSS/DSCN MKR DOCD: CPT

## 2023-06-09 PROCEDURE — 90677 PCV20 VACCINE IM: CPT | Performed by: INTERNAL MEDICINE

## 2023-06-09 PROCEDURE — 82962 GLUCOSE BLOOD TEST: CPT

## 2023-06-09 PROCEDURE — 99213 OFFICE O/P EST LOW 20 MIN: CPT

## 2023-06-09 PROCEDURE — 3078F DIAST BP <80 MM HG: CPT

## 2023-06-09 PROCEDURE — 3075F SYST BP GE 130 - 139MM HG: CPT

## 2023-06-09 PROCEDURE — 3044F HG A1C LEVEL LT 7.0%: CPT

## 2023-06-09 RX ORDER — LISINOPRIL 2.5 MG/1
2.5 TABLET ORAL DAILY
Qty: 30 TABLET | Refills: 5 | Status: SHIPPED | OUTPATIENT
Start: 2023-06-09

## 2023-06-09 ASSESSMENT — ENCOUNTER SYMPTOMS
SHORTNESS OF BREATH: 0
STRIDOR: 0
COUGH: 0
WHEEZING: 0

## 2023-06-09 NOTE — PROGRESS NOTES
Attending Physician Statement  I have discussed the care of [facility], including pertinent history and exam findings,  with the resident. I have reviewed the key elements of all parts of the encounter with the resident. I agree with the assessment, plan and orders as documented by the resident.   (GE Modifier)
Glucose    ________________________________________________________________________  Follow up and instructions: Follow up in 6 months for routine follow-up. Chuck received counseling on the following healthy behaviors: medication compliance, diet and exercise    Discussed use, benefit, and side effects of prescribed medications. Barriers to medication compliance addressed. All patient questions answered. Pt voiced understanding. Patient given educational materials - see patient instructions    Linwood Avelar MD  Internal Medicine Resident, PGY-2  [address]  6/9/2023,10:26 AM          This note is created with the assistance of a speech-recognition program. While intending to generate a document that actually reflects the content of the visit, the document can still have some mistakes which may not have been identified and corrected by editing.

## 2023-06-26 DIAGNOSIS — E11.9 TYPE 2 DIABETES MELLITUS WITHOUT COMPLICATION, WITHOUT LONG-TERM CURRENT USE OF INSULIN (HCC): ICD-10-CM

## 2023-08-08 ENCOUNTER — TELEPHONE (OUTPATIENT)
Dept: SURGERY | Age: 73
End: 2023-08-08

## 2023-08-28 ENCOUNTER — TELEPHONE (OUTPATIENT)
Dept: SURGERY | Age: 73
End: 2023-08-28

## 2023-08-29 RX ORDER — POLYETHYLENE GLYCOL 3350, SODIUM SULFATE ANHYDROUS, SODIUM BICARBONATE, SODIUM CHLORIDE, POTASSIUM CHLORIDE 236; 22.74; 6.74; 5.86; 2.97 G/4L; G/4L; G/4L; G/4L; G/4L
POWDER, FOR SOLUTION ORAL
Qty: 1 EACH | Refills: 0 | Status: SHIPPED | OUTPATIENT
Start: 2023-08-29

## 2023-09-29 DIAGNOSIS — H93.233 HYPERACUSIS OF BOTH EARS: ICD-10-CM

## 2023-09-29 DIAGNOSIS — H93.13 BILATERAL TINNITUS: ICD-10-CM

## 2023-09-29 DIAGNOSIS — E78.5 HYPERLIPIDEMIA, UNSPECIFIED HYPERLIPIDEMIA TYPE: ICD-10-CM

## 2023-09-29 NOTE — TELEPHONE ENCOUNTER
Request for Lipitor. Please review and e-scribe to pharmacy listed in chart if appropriate. Thank you.       Next Visit Date: 12/15/2023  Last Visit Date: 6/9/2023    Future Appointments   Date Time Provider [address]   12/15/2023 10:10 AM Mercedes Umaña MD [address] Maintenance   Topic Date Due    Diabetic retinal exam  Never done    DTaP/Tdap/Td vaccine (1 - Tdap) Never done    Colorectal Cancer Screen  Never done    Shingles vaccine (1 of 2) Never done    Hepatitis B vaccine (1 of 3 - Risk 3-dose series) Never done    COVID-19 Vaccine (4 - Pfizer series) 04/28/2023    Flu vaccine (1) 08/01/2023    Depression Screen  04/07/2024    A1C test (Diabetic or Prediabetic)  04/10/2024    Lipids  04/10/2024    Diabetic foot exam  05/12/2024    Pneumococcal 65+ years Vaccine  Completed    Hepatitis C screen  Completed    Hepatitis A vaccine  Aged Out    Hib vaccine  Aged Out    Meningococcal (ACWY) vaccine  Aged Out    GFR test (Diabetes, CKD 3-4, OR last GFR 15-59)  Discontinued       Hemoglobin A1C (%)   Date Value   04/10/2023 6.0   04/07/2023 5.9   10/21/2020 5.4             ( goal A1C is < 7)   No components found for: \"LABMICR\"  LDL Cholesterol (mg/dL)   Date Value   04/10/2023 36       (goal LDL is <100)   ALT (U/L)   Date Value   08/15/2014 14     BUN (mg/dL)   Date Value   04/10/2023 16     BP Readings from Last 3 Encounters:   06/09/23 134/60   05/23/23 (!) 140/70   05/12/23 (!) 142/70          (goal 120/80)    All Future Testing planned in [facility]  Lab Frequency Next Occurrence         Patient Active Problem List:     Type 2 diabetes mellitus without complication, without long-term current use of insulin (HCC)     Chronic leukopenia     Hyperlipidemia     Plantar fasciitis of right foot     Essential hypertension

## 2023-09-29 NOTE — TELEPHONE ENCOUNTER
Request for Elevil. Please review and e-scribe to pharmacy listed in chart if appropriate. Thank you.       Next Visit Date: 12/15/2023  Last Visit Date: 6/9/2023    Future Appointments   Date Time Provider [address]   12/15/2023 10:10 AM Jose Angel Strong MD [address] Maintenance   Topic Date Due    Diabetic retinal exam  Never done    DTaP/Tdap/Td vaccine (1 - Tdap) Never done    Colorectal Cancer Screen  Never done    Shingles vaccine (1 of 2) Never done    Hepatitis B vaccine (1 of 3 - Risk 3-dose series) Never done    COVID-19 Vaccine (4 - Pfizer series) 04/28/2023    Flu vaccine (1) 08/01/2023    Depression Screen  04/07/2024    A1C test (Diabetic or Prediabetic)  04/10/2024    Lipids  04/10/2024    Diabetic foot exam  05/12/2024    Pneumococcal 65+ years Vaccine  Completed    Hepatitis C screen  Completed    Hepatitis A vaccine  Aged Out    Hib vaccine  Aged Out    Meningococcal (ACWY) vaccine  Aged Out    GFR test (Diabetes, CKD 3-4, OR last GFR 15-59)  Discontinued       Hemoglobin A1C (%)   Date Value   04/10/2023 6.0   04/07/2023 5.9   10/21/2020 5.4             ( goal A1C is < 7)   No components found for: \"LABMICR\"  LDL Cholesterol (mg/dL)   Date Value   04/10/2023 36       (goal LDL is <100)   ALT (U/L)   Date Value   08/15/2014 14     BUN (mg/dL)   Date Value   04/10/2023 16     BP Readings from Last 3 Encounters:   06/09/23 134/60   05/23/23 (!) 140/70   05/12/23 (!) 142/70          (goal 120/80)    All Future Testing planned in [facility]  Lab Frequency Next Occurrence         Patient Active Problem List:     Type 2 diabetes mellitus without complication, without long-term current use of insulin (HCC)     Chronic leukopenia     Hyperlipidemia     Plantar fasciitis of right foot     Essential hypertension

## 2023-10-02 RX ORDER — AMITRIPTYLINE HYDROCHLORIDE 10 MG/1
10 TABLET, FILM COATED ORAL
Qty: 30 TABLET | Refills: 0 | Status: SHIPPED | OUTPATIENT
Start: 2023-10-02

## 2023-10-02 RX ORDER — ATORVASTATIN CALCIUM 20 MG/1
20 TABLET, FILM COATED ORAL DAILY
Qty: 90 TABLET | Refills: 3 | Status: SHIPPED | OUTPATIENT
Start: 2023-10-02

## 2023-11-24 ENCOUNTER — TELEPHONE (OUTPATIENT)
Dept: INTERNAL MEDICINE | Age: 73
End: 2023-11-24

## 2023-11-24 NOTE — TELEPHONE ENCOUNTER
----- Message from Gloria Mccallum sent at 11/22/2023  2:32 PM EST -----  Subject: Appointment Request    Reason for Call: New Patient/New to Provider Appointment needed: New   Patient Request Appointment    QUESTIONS    Reason for appointment request? Requested Provider unavailable - Dr. Nia Jara     Additional Information for Provider? Pt is in need of a new pt, est care   appt and there are no appts available for pt.  Please contact pt's   daughter, Tim Jackson, regarding this message to schedule pt and wife.  ---------------------------------------------------------------------------  --------------  Miah Hayes SANTIAGO  [phone]; OK to leave message on voicemail  ---------------------------------------------------------------------------  --------------  SCRIPT ANSWERS

## 2023-11-28 ENCOUNTER — TELEPHONE (OUTPATIENT)
Dept: INTERNAL MEDICINE | Age: 73
End: 2023-11-28

## 2023-11-28 NOTE — TELEPHONE ENCOUNTER
PC to pt to see if able to move appt on 12/15 up to 12/1 to see PCP during [facility], no answer. Left HIPAA compliant message identifying self and nature of call, requested call back to writer, phone number given.

## 2023-12-18 PROBLEM — Z12.5 SCREENING FOR PROSTATE CANCER: Status: ACTIVE | Noted: 2023-12-18

## 2023-12-26 DIAGNOSIS — E55.9 VITAMIN D DEFICIENCY: Primary | ICD-10-CM

## 2023-12-26 DIAGNOSIS — E55.9 HYPOVITAMINOSIS D: ICD-10-CM

## 2023-12-26 RX ORDER — MELATONIN
1000 DAILY
Qty: 90 TABLET | Refills: 3 | Status: SHIPPED | OUTPATIENT
Start: 2023-12-26

## 2024-01-15 ENCOUNTER — OFFICE VISIT (OUTPATIENT)
Dept: FAMILY MEDICINE CLINIC | Age: 74
End: 2024-01-15
Payer: MEDICAID

## 2024-01-15 VITALS
BODY MASS INDEX: 26.7 KG/M2 | WEIGHT: 175.6 LBS | DIASTOLIC BLOOD PRESSURE: 70 MMHG | SYSTOLIC BLOOD PRESSURE: 142 MMHG | OXYGEN SATURATION: 99 % | TEMPERATURE: 98.5 F | HEART RATE: 70 BPM

## 2024-01-15 DIAGNOSIS — Z00.00 PHYSICAL EXAM: ICD-10-CM

## 2024-01-15 DIAGNOSIS — E11.9 TYPE 2 DIABETES MELLITUS WITHOUT COMPLICATION, WITHOUT LONG-TERM CURRENT USE OF INSULIN (HCC): Primary | ICD-10-CM

## 2024-01-15 PROCEDURE — 99387 INIT PM E/M NEW PAT 65+ YRS: CPT | Performed by: STUDENT IN AN ORGANIZED HEALTH CARE EDUCATION/TRAINING PROGRAM

## 2024-01-15 PROCEDURE — 3078F DIAST BP <80 MM HG: CPT | Performed by: STUDENT IN AN ORGANIZED HEALTH CARE EDUCATION/TRAINING PROGRAM

## 2024-01-15 PROCEDURE — 3077F SYST BP >= 140 MM HG: CPT | Performed by: STUDENT IN AN ORGANIZED HEALTH CARE EDUCATION/TRAINING PROGRAM

## 2024-01-15 ASSESSMENT — PATIENT HEALTH QUESTIONNAIRE - PHQ9
2. FEELING DOWN, DEPRESSED OR HOPELESS: 0
SUM OF ALL RESPONSES TO PHQ QUESTIONS 1-9: 0
1. LITTLE INTEREST OR PLEASURE IN DOING THINGS: 0
SUM OF ALL RESPONSES TO PHQ9 QUESTIONS 1 & 2: 0
SUM OF ALL RESPONSES TO PHQ QUESTIONS 1-9: 0

## 2024-01-15 NOTE — ASSESSMENT & PLAN NOTE
Physical exam: Stable  Diet: Advised high fiber diet of 20 gram a day by increasing fresh fruits, vegetables and nuts.  Advised decreasing trans fats in diet including red meat and processed red meats, refined carbohydrates, and sweetened beverages.   Exercise: Advised moderate intensity exercise (brisk walking) 150 minutes a week or at least 5,000-10,000 steps a day.  Sleep: Advised good sleep habits including turning tv and phone off. 6-8 hours advised.

## 2024-01-15 NOTE — PROGRESS NOTES
[facility]  [facility]  [address]  [facility] [address]     Date of Visit:  1/15/2024  Patient Name: Chuck Pierre   Patient :  1950     CHIEF COMPLAINT:     Chuck Pierre is a 73 y.o. male who presents today for an general visit to be evaluated for the following condition(s):  Chief Complaint   Patient presents with    Annual Exam       REVIEW OF SYSTEM      Review of Systems   All other systems reviewed and are negative.      HISTORY OF PRESENT ILLNESS     HPI  Diet:  Does not eat fast food. Freshely prepared food, avoids white bread. Drinks aboout 2 cups of coffee a day. Drinks about 2 liters of water a day.  He eats fruit and vegetables about 5 servings. Cooks fresh Chinese food daily  Exercise:  Usually goes to [employer] 2 times a week and [place]  and was wakiing one hour in the evening daily.   Sleep:   Sleeps about 8 hours a day, wakes up about twice a night and reads for about an hour then falls back asleep. Patient feels rested in the morning.   REVIEWED INFORMATION      No Known Allergies    Patient Active Problem List   Diagnosis    Type 2 diabetes mellitus without complication, without long-term current use of insulin (HCC)    Chronic leukopenia    Hyperlipidemia    Plantar fasciitis of right foot    Essential hypertension    Physical exam       Past Medical History:   Diagnosis Date    Depression     Diabetes mellitus (HCC)     Essential hypertension 2023    Hyperlipidemia 2023    Type 2 diabetes mellitus without complication, without long-term current use of insulin (HCC) 2020       No past surgical history on file.     Social History     Socioeconomic History    Marital status:      Spouse name: None    Number of children: None    Years of education: None    Highest education level: None   Tobacco Use    Smoking status: Never    Smokeless tobacco: Never   Substance and Sexual Activity    Alcohol use: Yes     Comment:

## 2024-02-14 PROBLEM — Z00.00 PHYSICAL EXAM: Status: RESOLVED | Noted: 2023-12-18 | Resolved: 2024-02-14

## 2024-05-30 DIAGNOSIS — I10 ESSENTIAL HYPERTENSION: ICD-10-CM

## 2024-05-30 RX ORDER — LISINOPRIL 2.5 MG/1
2.5 TABLET ORAL DAILY
Qty: 30 TABLET | Refills: 5 | Status: SHIPPED | OUTPATIENT
Start: 2024-05-30

## 2024-05-30 NOTE — TELEPHONE ENCOUNTER
..Request for   Requested Prescriptions     Pending Prescriptions Disp Refills    lisinopril (PRINIVIL;ZESTRIL) 2.5 MG tablet [Pharmacy Med Name: LISINOPRIL 2.5 MG TABLET] 30 tablet 5     Sig: TAKE ONE TABLET BY MOUTH DAILY    .      Please review and e-scribe to pharmacy listed in chart if appropriate. Thank you.      Last Visit Date: 6/9/2023  Next Visit Date: Visit date not found    Future Appointments   Date Time Provider Department Center   7/15/2024  9:00 AM Katherine [facility]res [facility] [address]  [facility]       Health Maintenance   Topic Date Due    Diabetic retinal exam  Never done    DTaP/Tdap/Td vaccine (1 - Tdap) Never done    Shingles vaccine (1 of 2) Never done    Respiratory Syncytial Virus (RSV) Pregnant or age 60 yrs+ (1 - 1-dose 60+ series) Never done    Diabetic foot exam  05/12/2024    A1C test (Diabetic or Prediabetic)  12/21/2024    Lipids  12/21/2024    Depression Screen  01/15/2025    Colorectal Cancer Screen  01/03/2027    Flu vaccine  Completed    Pneumococcal 65+ years Vaccine  Completed    COVID-19 Vaccine  Completed    Hepatitis C screen  Completed    Hepatitis A vaccine  Aged Out    Hepatitis B vaccine  Aged Out    Hib vaccine  Aged Out    Polio vaccine  Aged Out    Meningococcal (ACWY) vaccine  Aged Out    GFR test (Diabetes, CKD 3-4, OR last GFR 15-59)  Discontinued       Hemoglobin A1C (%)   Date Value   12/21/2023 5.2   04/10/2023 6.0   04/07/2023 5.9             ( goal A1C is < 7)   No components found for: \"LABMICR\"  No components found for: \"LDLCHOLESTEROL\", \"LDLCALC\"    (goal LDL is <100)   AST (U/L)   Date Value   12/21/2023 17     ALT (U/L)   Date Value   12/21/2023 20     BUN (mg/dL)   Date Value   12/21/2023 15     BP Readings from Last 3 Encounters:   01/15/24 (!) 142/70   12/18/23 (!) 142/70   06/09/23 134/60          (goal 120/80)    All Future Testing planned in [facility]  Lab Frequency Next Occurrence         Patient Active Problem List:     Type 2 diabetes mellitus without

## 2024-06-04 ENCOUNTER — OFFICE VISIT (OUTPATIENT)
Dept: FAMILY MEDICINE CLINIC | Age: 74
End: 2024-06-04
Payer: MEDICAID

## 2024-06-04 VITALS
SYSTOLIC BLOOD PRESSURE: 140 MMHG | WEIGHT: 177 LBS | OXYGEN SATURATION: 100 % | TEMPERATURE: 97.5 F | HEART RATE: 83 BPM | BODY MASS INDEX: 26.91 KG/M2 | DIASTOLIC BLOOD PRESSURE: 70 MMHG

## 2024-06-04 DIAGNOSIS — I10 ESSENTIAL HYPERTENSION: ICD-10-CM

## 2024-06-04 DIAGNOSIS — M54.50 ACUTE BILATERAL LOW BACK PAIN WITHOUT SCIATICA: Primary | ICD-10-CM

## 2024-06-04 DIAGNOSIS — E11.9 TYPE 2 DIABETES MELLITUS WITHOUT COMPLICATION, WITHOUT LONG-TERM CURRENT USE OF INSULIN (HCC): ICD-10-CM

## 2024-06-04 PROCEDURE — 1123F ACP DISCUSS/DSCN MKR DOCD: CPT

## 2024-06-04 PROCEDURE — 3077F SYST BP >= 140 MM HG: CPT

## 2024-06-04 PROCEDURE — 3078F DIAST BP <80 MM HG: CPT

## 2024-06-04 PROCEDURE — 99214 OFFICE O/P EST MOD 30 MIN: CPT

## 2024-06-04 RX ORDER — PREDNISONE 10 MG/1
10 TABLET ORAL 2 TIMES DAILY
Qty: 10 TABLET | Refills: 0 | Status: SHIPPED | OUTPATIENT
Start: 2024-06-04 | End: 2024-06-09

## 2024-06-04 RX ORDER — LIDOCAINE 50 MG/G
1 PATCH TOPICAL DAILY
Qty: 10 PATCH | Refills: 0 | Status: SHIPPED | OUTPATIENT
Start: 2024-06-04 | End: 2024-06-14

## 2024-06-04 SDOH — ECONOMIC STABILITY: HOUSING INSECURITY
IN THE LAST 12 MONTHS, WAS THERE A TIME WHEN YOU DID NOT HAVE A STEADY PLACE TO SLEEP OR SLEPT IN A SHELTER (INCLUDING NOW)?: NO

## 2024-06-04 SDOH — ECONOMIC STABILITY: INCOME INSECURITY: HOW HARD IS IT FOR YOU TO PAY FOR THE VERY BASICS LIKE FOOD, HOUSING, MEDICAL CARE, AND HEATING?: NOT HARD AT ALL

## 2024-06-04 SDOH — ECONOMIC STABILITY: FOOD INSECURITY: WITHIN THE PAST 12 MONTHS, THE FOOD YOU BOUGHT JUST DIDN'T LAST AND YOU DIDN'T HAVE MONEY TO GET MORE.: NEVER TRUE

## 2024-06-04 SDOH — ECONOMIC STABILITY: FOOD INSECURITY: WITHIN THE PAST 12 MONTHS, YOU WORRIED THAT YOUR FOOD WOULD RUN OUT BEFORE YOU GOT MONEY TO BUY MORE.: NEVER TRUE

## 2024-06-04 ASSESSMENT — PATIENT HEALTH QUESTIONNAIRE - PHQ9
SUM OF ALL RESPONSES TO PHQ9 QUESTIONS 1 & 2: 0
SUM OF ALL RESPONSES TO PHQ QUESTIONS 1-9: 0
SUM OF ALL RESPONSES TO PHQ QUESTIONS 1-9: 0
2. FEELING DOWN, DEPRESSED OR HOPELESS: NOT AT ALL
1. LITTLE INTEREST OR PLEASURE IN DOING THINGS: NOT AT ALL
SUM OF ALL RESPONSES TO PHQ QUESTIONS 1-9: 0
SUM OF ALL RESPONSES TO PHQ QUESTIONS 1-9: 0

## 2024-06-04 NOTE — PROGRESS NOTES
mellitus without complication, without long-term current use of insulin (HCC)  -aware home BP and glucose readings may elevate on steroid dose      Return in about 1 week (around 6/11/2024), or if symptoms worsen or fail to improve, for f/u back pain.               An electronic signature was used to authenticate this note.    --Amanda White, CRISTIANA - CNP

## 2024-06-05 ENCOUNTER — HOSPITAL ENCOUNTER (OUTPATIENT)
Age: 74
Discharge: HOME | End: 2024-06-07
Payer: MEDICAID

## 2024-06-05 ENCOUNTER — HOSPITAL ENCOUNTER (OUTPATIENT)
Dept: GENERAL RADIOLOGY | Age: 74
Discharge: HOME | End: 2024-06-07
Payer: MEDICAID

## 2024-06-05 ENCOUNTER — TELEPHONE (OUTPATIENT)
Dept: FAMILY MEDICINE CLINIC | Age: 74
End: 2024-06-05

## 2024-06-05 DIAGNOSIS — M54.50 ACUTE BILATERAL LOW BACK PAIN WITHOUT SCIATICA: ICD-10-CM

## 2024-06-05 PROCEDURE — 72100 X-RAY EXAM L-S SPINE 2/3 VWS: CPT

## 2024-06-05 NOTE — TELEPHONE ENCOUNTER
Patient saw Luh on 06/04/24 for back pain, she wanted him to follow up with Dr. Gutierres in 1 week, can you please help with that I see nothing. You find I'll make and call patient.    Please advise

## 2024-06-14 ENCOUNTER — HOSPITAL ENCOUNTER (OUTPATIENT)
Age: 74
End: 2024-06-14
Payer: MEDICAID

## 2024-06-14 ENCOUNTER — HOSPITAL ENCOUNTER (OUTPATIENT)
Dept: GENERAL RADIOLOGY | Age: 74
End: 2024-06-14
Payer: MEDICAID

## 2024-06-14 ENCOUNTER — OFFICE VISIT (OUTPATIENT)
Dept: FAMILY MEDICINE CLINIC | Age: 74
End: 2024-06-14
Payer: MEDICAID

## 2024-06-14 VITALS
WEIGHT: 177.2 LBS | SYSTOLIC BLOOD PRESSURE: 138 MMHG | OXYGEN SATURATION: 97 % | BODY MASS INDEX: 26.86 KG/M2 | DIASTOLIC BLOOD PRESSURE: 62 MMHG | HEIGHT: 68 IN | HEART RATE: 60 BPM | TEMPERATURE: 98 F

## 2024-06-14 DIAGNOSIS — J30.9 ALLERGIC CONJUNCTIVITIS OF RIGHT EYE AND RHINITIS: ICD-10-CM

## 2024-06-14 DIAGNOSIS — M25.561 CHRONIC PAIN OF BOTH KNEES: Primary | ICD-10-CM

## 2024-06-14 DIAGNOSIS — H10.11 ALLERGIC CONJUNCTIVITIS OF RIGHT EYE AND RHINITIS: ICD-10-CM

## 2024-06-14 DIAGNOSIS — G89.29 CHRONIC PAIN OF BOTH KNEES: Primary | ICD-10-CM

## 2024-06-14 DIAGNOSIS — M25.561 CHRONIC PAIN OF BOTH KNEES: ICD-10-CM

## 2024-06-14 DIAGNOSIS — G89.29 CHRONIC PAIN OF BOTH KNEES: ICD-10-CM

## 2024-06-14 DIAGNOSIS — M25.562 CHRONIC PAIN OF BOTH KNEES: ICD-10-CM

## 2024-06-14 DIAGNOSIS — M25.562 CHRONIC PAIN OF BOTH KNEES: Primary | ICD-10-CM

## 2024-06-14 PROCEDURE — 73562 X-RAY EXAM OF KNEE 3: CPT

## 2024-06-14 PROCEDURE — 3075F SYST BP GE 130 - 139MM HG: CPT | Performed by: STUDENT IN AN ORGANIZED HEALTH CARE EDUCATION/TRAINING PROGRAM

## 2024-06-14 PROCEDURE — 3078F DIAST BP <80 MM HG: CPT | Performed by: STUDENT IN AN ORGANIZED HEALTH CARE EDUCATION/TRAINING PROGRAM

## 2024-06-14 PROCEDURE — 99214 OFFICE O/P EST MOD 30 MIN: CPT | Performed by: STUDENT IN AN ORGANIZED HEALTH CARE EDUCATION/TRAINING PROGRAM

## 2024-06-14 PROCEDURE — 1123F ACP DISCUSS/DSCN MKR DOCD: CPT | Performed by: STUDENT IN AN ORGANIZED HEALTH CARE EDUCATION/TRAINING PROGRAM

## 2024-06-14 RX ORDER — OLOPATADINE HYDROCHLORIDE 1 MG/ML
1 SOLUTION/ DROPS OPHTHALMIC 2 TIMES DAILY
Qty: 5 ML | Refills: 0 | Status: SHIPPED | OUTPATIENT
Start: 2024-06-14 | End: 2024-07-14

## 2024-06-14 NOTE — PROGRESS NOTES
[facility]  [facility]  [address] [facility] [facility] [address]     Date of Visit:  2024  Patient Name: Chuck Pierre   Patient :  1950     CHIEF COMPLAINT:     Chuck Pierre is a 73 y.o. male who presents today for an general visit to be evaluated for the following condition(s):  Chief Complaint   Patient presents with    Facial Swelling     Right eye    Leg Pain    Memory Loss     Memory has been off slightly and math has become difficult        REVIEW OF SYSTEM      Review of Systems   Psychiatric/Behavioral:  Positive for memory loss.        HISTORY OF PRESENT ILLNESS     Leg Pain     Memory Loss        Patient is a 73-year-old male with a past medical history of diabetes mellitus, hyperlipidemia, hypertension who presents to the office for follow-up.  Back pain:  Patient states that using the patches and the exercises help with his back pain no longer has any back pain.  Knee pain:  Patient has bilateral knee pain that has been getting worse over the past few years.  Patient states that his left leg locks up on occasion.  He would like to go to physical therapy and get an x-ray of his knee.  Denies any trauma.  Swollen right eye:  Patient noticed for the past few days his right eyes have been swollen itchy and painful.  He does have a history of allergies and nose congestion.  Patient also states that he has runny nose only on his left nostril that occurs when he goes from hot to cold or vice versa.  REVIEWED INFORMATION      No Known Allergies    Patient Active Problem List   Diagnosis    Type 2 diabetes mellitus without complication, without long-term current use of insulin (HCC)    Chronic leukopenia    Hyperlipidemia    Plantar fasciitis of right foot    Essential hypertension    Chronic pain of both knees    Allergic conjunctivitis of right eye and rhinitis       Past Medical History:   Diagnosis Date    Depression     Diabetes mellitus (HCC)     Essential

## 2024-06-17 ENCOUNTER — OFFICE VISIT (OUTPATIENT)
Dept: FAMILY MEDICINE CLINIC | Age: 74
End: 2024-06-17
Payer: MEDICAID

## 2024-06-17 VITALS
HEART RATE: 67 BPM | OXYGEN SATURATION: 97 % | BODY MASS INDEX: 26.67 KG/M2 | TEMPERATURE: 97.7 F | HEIGHT: 68 IN | SYSTOLIC BLOOD PRESSURE: 146 MMHG | DIASTOLIC BLOOD PRESSURE: 70 MMHG | WEIGHT: 176 LBS

## 2024-06-17 DIAGNOSIS — G47.00 INSOMNIA, UNSPECIFIED TYPE: Primary | ICD-10-CM

## 2024-06-17 DIAGNOSIS — R25.2 LEG CRAMPING: ICD-10-CM

## 2024-06-17 DIAGNOSIS — R06.83 SNORING: ICD-10-CM

## 2024-06-17 DIAGNOSIS — B35.3 TINEA PEDIS OF BOTH FEET: ICD-10-CM

## 2024-06-17 PROCEDURE — 99215 OFFICE O/P EST HI 40 MIN: CPT | Performed by: STUDENT IN AN ORGANIZED HEALTH CARE EDUCATION/TRAINING PROGRAM

## 2024-06-17 PROCEDURE — 3078F DIAST BP <80 MM HG: CPT | Performed by: STUDENT IN AN ORGANIZED HEALTH CARE EDUCATION/TRAINING PROGRAM

## 2024-06-17 PROCEDURE — 1123F ACP DISCUSS/DSCN MKR DOCD: CPT | Performed by: STUDENT IN AN ORGANIZED HEALTH CARE EDUCATION/TRAINING PROGRAM

## 2024-06-17 PROCEDURE — 3077F SYST BP >= 140 MM HG: CPT | Performed by: STUDENT IN AN ORGANIZED HEALTH CARE EDUCATION/TRAINING PROGRAM

## 2024-06-17 RX ORDER — PRENATAL VIT 91/IRON/FOLIC/DHA 28-975-200
COMBINATION PACKAGE (EA) ORAL
Qty: 42 G | Refills: 0 | Status: SHIPPED | OUTPATIENT
Start: 2024-06-17

## 2024-06-17 ASSESSMENT — SLEEP AND FATIGUE QUESTIONNAIRES
ESS TOTAL SCORE: 9
HOW LIKELY ARE YOU TO NOD OFF OR FALL ASLEEP WHILE LYING DOWN TO REST IN THE AFTERNOON WHEN CIRCUMSTANCES PERMIT: HIGH CHANCE OF DOZING
HOW LIKELY ARE YOU TO NOD OFF OR FALL ASLEEP WHILE WATCHING TV: HIGH CHANCE OF DOZING
HOW LIKELY ARE YOU TO NOD OFF OR FALL ASLEEP WHILE SITTING QUIETLY AFTER LUNCH WITHOUT ALCOHOL: WOULD NEVER DOZE
HOW LIKELY ARE YOU TO NOD OFF OR FALL ASLEEP WHILE SITTING AND TALKING TO SOMEONE: WOULD NEVER DOZE
HOW LIKELY ARE YOU TO NOD OFF OR FALL ASLEEP WHILE SITTING INACTIVE IN A PUBLIC PLACE: WOULD NEVER DOZE
HOW LIKELY ARE YOU TO NOD OFF OR FALL ASLEEP IN A CAR, WHILE STOPPED FOR A FEW MINUTES IN TRAFFIC: WOULD NEVER DOZE
HOW LIKELY ARE YOU TO NOD OFF OR FALL ASLEEP WHEN YOU ARE A PASSENGER IN A CAR FOR AN HOUR WITHOUT A BREAK: WOULD NEVER DOZE
HOW LIKELY ARE YOU TO NOD OFF OR FALL ASLEEP WHILE SITTING AND READING: HIGH CHANCE OF DOZING

## 2024-06-18 PROBLEM — R06.83 SNORING: Status: ACTIVE | Noted: 2024-06-18

## 2024-06-18 PROBLEM — R25.2 LEG CRAMPING: Status: ACTIVE | Noted: 2024-06-18

## 2024-06-18 PROBLEM — B35.3 TINEA PEDIS OF BOTH FEET: Status: ACTIVE | Noted: 2024-06-18

## 2024-06-18 PROBLEM — G47.00 INSOMNIA: Status: ACTIVE | Noted: 2024-06-18

## 2024-06-18 NOTE — ASSESSMENT & PLAN NOTE
A/P: Uncontrolled  - Patient has an issue with sleep maintenance  - Frederick score 9.  Due to his snoring, leg cramping, will suggest getting a sleep study done at the sleep center.  -MoCA-28 most rule out sleep apnea as cause of memory loss.

## 2024-06-18 NOTE — ASSESSMENT & PLAN NOTE
A/P: Uncontrolled  - Fungal elements noted on microscope evaluation  - Terbinafine cream given to patient  - Advised patient to wear white bleach socks

## 2024-06-18 NOTE — PROGRESS NOTES
[facility]  [facility]  [address]  [place] [address]     Date of Visit:  2024  Patient Name: Chuck Pierre   Patient :  1950     CHIEF COMPLAINT:     Chuck Pierre is a 73 y.o. male who presents today for an general visit to be evaluated for the following condition(s):  Chief Complaint   Patient presents with    Memory Loss     moca    Fungus        REVIEW OF SYSTEM      Review of Systems   Psychiatric/Behavioral:  Positive for memory loss.        HISTORY OF PRESENT ILLNESS     Memory Loss      Memory loss:  Patient presents with complaints of memory loss over the past few months.  Patient states that he is having a difficult time to remember details.  He has noticed a slowing in his memory.  Example it took him a few days for him to remember his great grandparents.  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  Another example patient states that he cannot remember distant acquaintance phone number which was very easy for him to remember in the past.  Patient do not forget to turn off the stove or forget his keys.  He continues to drive without any issues.  He can complete all of his ADLs without any issues.  He denies any depression, grief or anxiety.  Patient does endorse sleeping 3 hours at a time at nighttime for the past 15 years.  Patient will wake up approximately around 3 AM and stay up until 5 AM reading books.  He would then fall back asleep and sleep until 8 AM.  Patient states that he feels refreshed when he wakes up however he can fall asleep quite easily during the daytime.  He also endorses increasing nighttime leg muscle cramps.  He denies any restless legs at nighttime.  His wife does endorse him snoring quite loudly a couple times throughout the week.  Denies any apneic episodes per wife.    Foot itching:  Patient has noticed interdigit itching of his

## 2024-06-24 ENCOUNTER — TELEPHONE (OUTPATIENT)
Dept: FAMILY MEDICINE CLINIC | Age: 74
End: 2024-06-24

## 2024-06-24 NOTE — TELEPHONE ENCOUNTER
Patient daughter calling stating that since starting the eye drops patient is having blurred vision in the eye the was unaffected. The one that was swollen when he was here is ok.

## 2024-06-25 ENCOUNTER — OFFICE VISIT (OUTPATIENT)
Dept: FAMILY MEDICINE CLINIC | Age: 74
End: 2024-06-25
Payer: MEDICAID

## 2024-06-25 VITALS
TEMPERATURE: 97.5 F | HEART RATE: 67 BPM | DIASTOLIC BLOOD PRESSURE: 66 MMHG | SYSTOLIC BLOOD PRESSURE: 140 MMHG | OXYGEN SATURATION: 96 % | WEIGHT: 181.4 LBS | BODY MASS INDEX: 27.58 KG/M2

## 2024-06-25 DIAGNOSIS — H53.8 BLURRY VISION: ICD-10-CM

## 2024-06-25 DIAGNOSIS — E11.9 TYPE 2 DIABETES MELLITUS WITHOUT COMPLICATION, WITHOUT LONG-TERM CURRENT USE OF INSULIN (HCC): Primary | ICD-10-CM

## 2024-06-25 LAB — HBA1C MFR BLD: 6 %

## 2024-06-25 PROCEDURE — 1123F ACP DISCUSS/DSCN MKR DOCD: CPT | Performed by: STUDENT IN AN ORGANIZED HEALTH CARE EDUCATION/TRAINING PROGRAM

## 2024-06-25 PROCEDURE — 83036 HEMOGLOBIN GLYCOSYLATED A1C: CPT | Performed by: STUDENT IN AN ORGANIZED HEALTH CARE EDUCATION/TRAINING PROGRAM

## 2024-06-25 PROCEDURE — 3075F SYST BP GE 130 - 139MM HG: CPT | Performed by: STUDENT IN AN ORGANIZED HEALTH CARE EDUCATION/TRAINING PROGRAM

## 2024-06-25 PROCEDURE — 99215 OFFICE O/P EST HI 40 MIN: CPT | Performed by: STUDENT IN AN ORGANIZED HEALTH CARE EDUCATION/TRAINING PROGRAM

## 2024-06-25 PROCEDURE — 3078F DIAST BP <80 MM HG: CPT | Performed by: STUDENT IN AN ORGANIZED HEALTH CARE EDUCATION/TRAINING PROGRAM

## 2024-06-25 PROCEDURE — 3044F HG A1C LEVEL LT 7.0%: CPT | Performed by: STUDENT IN AN ORGANIZED HEALTH CARE EDUCATION/TRAINING PROGRAM

## 2024-06-25 NOTE — ASSESSMENT & PLAN NOTE
A/P: uncontrolled  -left pupil has hazy particulates,PERRLA, vision impaired  - no trauma or pain  -will send to opthomalogist  -stop pataday.

## 2024-06-25 NOTE — PROGRESS NOTES
Pulmonary effort is normal.   Musculoskeletal:         General: Normal range of motion.   Skin:     General: Skin is dry.   Neurological:      Mental Status: He is alert. Mental status is at baseline.   Psychiatric:         Mood and Affect: Mood normal.         Behavior: Behavior normal.     Unable to obtain IOP    ASSESSMENT/PLAN     1. Type 2 diabetes mellitus without complication, without long-term current use of insulin ([facility])  Assessment & Plan:  A/P: controlled  -hgba1c 6.0  -continue current medications  Orders:  -     POCT glycosylated hemoglobin (Hb A1C)  2. Blurry vision  Assessment & Plan:  A/P: uncontrolled  -left pupil has hazy particulates,PERRLA, vision impaired  - no trauma or pain  -will send to opthomalogist  -stop pataday.        No follow-ups on file.    This visit took greater than 45 minutes to complete. This included collecting history, completing visual tests, calling opthalmologist to book appointment and completing this note.  COMMUNICATION:       Electronically signed by Katherine Gutierres MD on 6/25/2024 at 4:54 PM

## 2024-06-28 ENCOUNTER — HOSPITAL ENCOUNTER (OUTPATIENT)
Dept: PHYSICAL THERAPY | Facility: CLINIC | Age: 74
Setting detail: THERAPIES SERIES
Discharge: HOME | End: 2024-06-28
Payer: MEDICAID

## 2024-06-28 PROCEDURE — 97110 THERAPEUTIC EXERCISES: CPT

## 2024-06-28 PROCEDURE — 97161 PT EVAL LOW COMPLEX 20 MIN: CPT

## 2024-06-28 NOTE — CONSULTS
strength, balance, and decreased HS muscle length. Pt has difficulty with getting out of chairs and bending to lift objects causing difficulty with grocery shopping and household activities.Patient would benefit from skilled physical therapy services in order to: Increase bilateral LE strength, increase core strength, increase knowledge on lifting biomechanics, increase balance, and decrease knee and back pain.    Problem list, as detailed above:   [x] ? Pain  3-4/10 bilateral knee pain and 4/10 low back pain   [x] ? ROM Decrease HS muscle length  [x] ? Strength Global BLE deficits   [x] ? Function: 30% functionally impaired LEFS  [x] ? Balance Decrease SLS balance with eyes opened and closed  [x] Gait Deviations Antalgic Gait        STG: (to be met in 8 treatments)  ? Pain: Pt must demo <2/10 bilateral knee and low back pain levels to increase overall functional mobility.  ? ROM: Pt must demo >60 degrees hamstring muscle length in order to increase ability to perform household tasks.  ? Strength:Pt must demo 4/4 bilateral LE strength to increase ability to perform ADLs/iADLS.  ? Function:  Patient to be independent with home exercise program as demonstrated by performance with correct form without cues.  Pt demo >15 seconds on SLS with eyes open and closed to increase balance for safety at home.  LTG: (to be met in 12 treatments)  Pt must demo <20% functional impairment with LEFS to increase overall functional mobility.  Pt must demo 0/10 bilateral knee and low back pain levels to increase overall functional mobility.  Pt must demo 5/5 BLE strength to increase ability to perform ADLs/iADLS.  Pt demo increase in lifting biomechanics to decrease LBP with lifting household objects.                   Patient goals: To decrease knee pain and increase LE strength     Rehab Potential:  [x] Good  [] Fair  [] Poor   Suggested Professional Referral:  [x] No  [] Yes:  Barriers to Goal Achievement:  [x] No  [] Yes:  Domestic

## 2024-07-12 ENCOUNTER — HOSPITAL ENCOUNTER (OUTPATIENT)
Dept: PHYSICAL THERAPY | Facility: CLINIC | Age: 74
Setting detail: THERAPIES SERIES
Discharge: HOME | End: 2024-07-12

## 2024-07-12 NOTE — FLOWSHEET NOTE
[] [facility]  Outpatient Rehabilitation &  Therapy  [address]  [phone]  [phone] [] [facility]  Outpatient Rehabilitation &  Therapy  [address]  P: [phone]  F: [phone] [] [facility]  Outpatient Rehabilitation &  Therapy  [address]  P: [phone]  F: [phone] [] [facility]  Outpatient Rehabilitation &  Therapy  [address]  [phone]  [phone] [] [facility]  Outpatient Rehabilitation &  Therapy  [address]   P: [phone]  F: [phone]  [] [facility]  Outpatient Rehabilitation &  Therapy  [address]  P: [phone]  F: [phone] [] [facility]  Outpatient Rehabilitation &  Therapy  [address]  P: [phone]  F: [phone] [] [facility]  Outpatient Rehabilitation &  Therapy  [address]  P: [phone]  F: [phone] [] [facility]  Outpatient Rehabilitation &  Therapy  [address]  P: [phone]  F: [phone]  [] [facility] Outpatient Rehabilitation &  Therapy  [address]  P: [phone]  F: [phone]     Therapy Cancel/No Show note    Date: 2024  Patient: Chuck Pierre  : 1950  MRN: [id number]    Cancels/No Shows to date: 0    For today's appointment patient:    [x]  Cancelled    [] Rescheduled appointment    [] No-show     Reason given by patient:    []  Patient ill    []  Conflicting appointment    [] No transportation      [] Conflict with work    [x] No reason given    [] Weather related    [] COVID-19    [] Other:      Comments:        [x] Next appointment was confirmed    Electronically signed by: Nat Watson PT

## 2024-07-23 ENCOUNTER — HOSPITAL ENCOUNTER (OUTPATIENT)
Dept: PHYSICAL THERAPY | Facility: CLINIC | Age: 74
Setting detail: THERAPIES SERIES
Discharge: HOME | End: 2024-07-23
Payer: MEDICAID

## 2024-07-23 PROCEDURE — 97110 THERAPEUTIC EXERCISES: CPT

## 2024-07-23 NOTE — FLOWSHEET NOTE
Tx:  [x] No  [] Yes  Action:  Comments: Upon questioning, pt reports he felt \"good\" following his initial evaluation. He mentions his exercises went \"good.\"    Objective:  Modalities:   Precautions:  Exercises:  Exercise Reps/ Time Weight/ Level Comments   Nu Step 5' L2 Initiated 7/23         SEATED      LAQ 10 ea  1 lb     Seated knee flexion 20x orange Initiated 7/23   Seated hip add iso 20x Play ball Initiated 7/23   Seated hip abd 20x orange          SUPINE       bridges 20x  Initiated 7/23   SLR 20x  Initiated 7/23         SIDELYING       Hip abduction 10x  Initiated 7/23         STANDING      Heel raises 20x  Initiated 7/23   Mini squats 10x  Initiated 7/23               Gatroc Stretch 2x30\"       Soleus Stretch  2x30\"              Seated HS Stretch 2x30\"       Other:       Treatment Charges: Mins Units   []  Modalities     []  Ther Exercise 38 3   []  Manual Therapy     []  Ther Activities     []  Neuro Re-ed     []  Vasocompression     [] Gait     []  Other     Total Billable time 38 3     Frequency:  2 x/week for 12 visits     Assessment: [x] Progressing toward goals. Began today's treatment with use of the NuStep to assist with increasing circulation and tissue elasticity. Pt then completed rest of therapeutic exercises as noted above. Several initiations with good tolerance. Daughter present in room to assist with language barrier and explanation of technique. PTA offered assistance keeping pt in true sidelying during hip abduction avoiding hip flexion recruitment. Demonstrates difficulty understanding technique with soleus stretching while in standing at slant board. Scheduled future appointment. Pt's daughter said they'll be out of town next week and only wanted to schedule for 8/9/24 as they are hoping to go on a trip but do not have plans finalized.    [] No change.     [] Other:  [x] Patient would continue to benefit from skilled physical therapy services in order to:  Increase bilateral LE strength,

## 2024-08-09 ENCOUNTER — HOSPITAL ENCOUNTER (OUTPATIENT)
Dept: PHYSICAL THERAPY | Facility: CLINIC | Age: 74
Setting detail: THERAPIES SERIES
Discharge: HOME | End: 2024-08-09

## 2024-08-09 NOTE — FLOWSHEET NOTE
[] [facility]  Outpatient Rehabilitation &  Therapy  [address]  [phone]  [phone] [x] [facility]  Outpatient Rehabilitation &  Therapy  [address]  P: [phone]  F: [phone] [] [facility]  Outpatient Rehabilitation &  Therapy  [address]  P: [phone]  F: [phone] [] [facility]  Outpatient Rehabilitation &  Therapy  [address]  [phone]  [phone] [] [facility]  Outpatient Rehabilitation &  Therapy  [address]   P: [phone]  F: [phone]  [] [facility]  Outpatient Rehabilitation &  Therapy  [address]  P: [phone]  F: [phone] [] [facility]  Outpatient Rehabilitation &  Therapy  [address]  P: [phone]  F: [phone] [] [facility]  Outpatient Rehabilitation &  Therapy  [address]  P: [phone]  F: [phone] [] [facility]  Outpatient Rehabilitation &  Therapy  [address]  P: [phone]  F: [phone]  [] [facility] Outpatient Rehabilitation &  Therapy  [address]  P: [phone]  F: [phone]     Therapy Cancel/No Show note    Date: 2024  Patient: Chuck Pierre  : 1950  MRN: [id number]    Cancels/No Shows to date: 0    For today's appointment patient:    [x]  Cancelled    [] Rescheduled appointment    [] No-show     Reason given by patient:    [x]  Patient ill    []  Conflicting appointment    [] No transportation      [] Conflict with work    [] No reason given    [] Weather related    [] COVID-19    [] Other:      Comments:  Pt is not scheduled for anymore appointments at this date, said he would call back to reschedule.      [x] Next appointment was not confirmed    Electronically signed by: Nat Watson,

## 2024-11-08 DIAGNOSIS — E11.9 TYPE 2 DIABETES MELLITUS WITHOUT COMPLICATION, WITHOUT LONG-TERM CURRENT USE OF INSULIN (HCC): ICD-10-CM

## 2024-11-08 NOTE — TELEPHONE ENCOUNTER
*Patient is out of medication and hasn't taken any in a few days*    Chuck Pierre is calling to request a refill on the following medication(s):    Medication Request:  Requested Prescriptions     Pending Prescriptions Disp Refills    metFORMIN (GLUCOPHAGE) 500 MG tablet 360 tablet 0     Sig: Take 1 tablet by mouth 2 times daily (with meals)       Last Visit Date (If Applicable):  6/25/2024    Next Visit Date:    Visit date not found    Last refilled 6/29/23. Prescription pending.

## 2025-01-08 DIAGNOSIS — I10 ESSENTIAL HYPERTENSION: ICD-10-CM

## 2025-01-08 RX ORDER — LISINOPRIL 2.5 MG/1
2.5 TABLET ORAL DAILY
Qty: 30 TABLET | Refills: 5 | Status: SHIPPED | OUTPATIENT
Start: 2025-01-08

## 2025-01-08 NOTE — TELEPHONE ENCOUNTER
Chuck Pierre is calling to request a refill on the following medication(s):    Medication Request:  Requested Prescriptions     Pending Prescriptions Disp Refills    lisinopril (PRINIVIL;ZESTRIL) 2.5 MG tablet 30 tablet 5     Sig: Take 1 tablet by mouth daily       Last Visit Date (If Applicable):  6/25/2024    Next Visit Date:    2/7/2025

## 2025-01-15 ENCOUNTER — HOSPITAL ENCOUNTER (OUTPATIENT)
Age: 75
Discharge: HOME | End: 2025-01-17
Payer: MEDICAID

## 2025-01-15 ENCOUNTER — OFFICE VISIT (OUTPATIENT)
Dept: FAMILY MEDICINE CLINIC | Age: 75
End: 2025-01-15
Payer: MEDICAID

## 2025-01-15 ENCOUNTER — HOSPITAL ENCOUNTER (OUTPATIENT)
Dept: GENERAL RADIOLOGY | Age: 75
Discharge: HOME | End: 2025-01-17
Payer: MEDICAID

## 2025-01-15 VITALS
BODY MASS INDEX: 25.88 KG/M2 | SYSTOLIC BLOOD PRESSURE: 118 MMHG | TEMPERATURE: 97.3 F | WEIGHT: 170.8 LBS | HEIGHT: 68 IN | HEART RATE: 60 BPM | DIASTOLIC BLOOD PRESSURE: 66 MMHG | OXYGEN SATURATION: 97 %

## 2025-01-15 DIAGNOSIS — R05.3 CHRONIC COUGH: Primary | ICD-10-CM

## 2025-01-15 DIAGNOSIS — R22.31 MASS OF FINGER OF RIGHT HAND: ICD-10-CM

## 2025-01-15 DIAGNOSIS — R05.3 CHRONIC COUGH: ICD-10-CM

## 2025-01-15 DIAGNOSIS — R61 NIGHT SWEATS: ICD-10-CM

## 2025-01-15 PROCEDURE — 71046 X-RAY EXAM CHEST 2 VIEWS: CPT

## 2025-01-15 PROCEDURE — 3078F DIAST BP <80 MM HG: CPT | Performed by: STUDENT IN AN ORGANIZED HEALTH CARE EDUCATION/TRAINING PROGRAM

## 2025-01-15 PROCEDURE — 99214 OFFICE O/P EST MOD 30 MIN: CPT | Performed by: STUDENT IN AN ORGANIZED HEALTH CARE EDUCATION/TRAINING PROGRAM

## 2025-01-15 PROCEDURE — 3074F SYST BP LT 130 MM HG: CPT | Performed by: STUDENT IN AN ORGANIZED HEALTH CARE EDUCATION/TRAINING PROGRAM

## 2025-01-15 PROCEDURE — 1123F ACP DISCUSS/DSCN MKR DOCD: CPT | Performed by: STUDENT IN AN ORGANIZED HEALTH CARE EDUCATION/TRAINING PROGRAM

## 2025-01-15 PROCEDURE — 73120 X-RAY EXAM OF HAND: CPT

## 2025-01-15 SDOH — ECONOMIC STABILITY: FOOD INSECURITY: WITHIN THE PAST 12 MONTHS, THE FOOD YOU BOUGHT JUST DIDN'T LAST AND YOU DIDN'T HAVE MONEY TO GET MORE.: NEVER TRUE

## 2025-01-15 SDOH — ECONOMIC STABILITY: FOOD INSECURITY: WITHIN THE PAST 12 MONTHS, YOU WORRIED THAT YOUR FOOD WOULD RUN OUT BEFORE YOU GOT MONEY TO BUY MORE.: NEVER TRUE

## 2025-01-15 ASSESSMENT — PATIENT HEALTH QUESTIONNAIRE - PHQ9
2. FEELING DOWN, DEPRESSED OR HOPELESS: NOT AT ALL
SUM OF ALL RESPONSES TO PHQ QUESTIONS 1-9: 0
SUM OF ALL RESPONSES TO PHQ9 QUESTIONS 1 & 2: 0
1. LITTLE INTEREST OR PLEASURE IN DOING THINGS: NOT AT ALL

## 2025-01-16 ENCOUNTER — HOSPITAL ENCOUNTER (OUTPATIENT)
Age: 75
Setting detail: SPECIMEN
Discharge: HOME | End: 2025-01-16

## 2025-01-16 DIAGNOSIS — R05.3 CHRONIC COUGH: ICD-10-CM

## 2025-01-16 LAB
BASOPHILS # BLD: 0.04 K/UL (ref 0–0.2)
BASOPHILS NFR BLD: 1 % (ref 0–2)
CRP SERPL HS-MCNC: 5 MG/L (ref 0–5)
EOSINOPHIL # BLD: 0.04 K/UL (ref 0–0.44)
EOSINOPHILS RELATIVE PERCENT: 1 % (ref 1–4)
ERYTHROCYTE [DISTWIDTH] IN BLOOD BY AUTOMATED COUNT: 12.9 % (ref 11.8–14.4)
HCT VFR BLD AUTO: 45.7 % (ref 40.7–50.3)
HGB BLD-MCNC: 15.7 G/DL (ref 13–17)
IMM GRANULOCYTES # BLD AUTO: <0.03 K/UL (ref 0–0.3)
IMM GRANULOCYTES NFR BLD: 0 %
LYMPHOCYTES NFR BLD: 1.21 K/UL (ref 1.1–3.7)
LYMPHOCYTES RELATIVE PERCENT: 35 % (ref 24–43)
MCH RBC QN AUTO: 30.9 PG (ref 25.2–33.5)
MCHC RBC AUTO-ENTMCNC: 34.4 G/DL (ref 28.4–34.8)
MCV RBC AUTO: 90 FL (ref 82.6–102.9)
MONOCYTES NFR BLD: 0.32 K/UL (ref 0.1–1.2)
MONOCYTES NFR BLD: 9 % (ref 3–12)
NEUTROPHILS NFR BLD: 54 % (ref 36–65)
NEUTS SEG NFR BLD: 1.86 K/UL (ref 1.5–8.1)
NRBC BLD-RTO: 0 PER 100 WBC
PLATELET # BLD AUTO: 141 K/UL (ref 138–453)
PMV BLD AUTO: 12.3 FL (ref 8.1–13.5)
RBC # BLD AUTO: 5.08 M/UL (ref 4.21–5.77)
WBC OTHER # BLD: 3.5 K/UL (ref 3.5–11.3)

## 2025-01-16 NOTE — PROGRESS NOTES
[facility]  [facility]  [address]  [place] [address]     Date of Visit:  2025  Patient Name: Chuck Pierre   Patient :  1950     CHIEF COMPLAINT:     Chuck Pierre is a 74 y.o. male who presents today for an general visit to be evaluated for the following condition(s):  Chief Complaint   Patient presents with    Cough     Flu like symptoms mostly at night for about 60days       REVIEW OF SYSTEM      Review of Systems    HISTORY OF PRESENT ILLNESS     History of Present Illness  The patient presents for evaluation of persistent cough, right hand pain, and night sweats.    He reports a history of travel, following which he experienced flu-like symptoms approximately 2 weeks after his return. These symptoms included a persistent cough that has now lasted for 3 weeks. He does not report any hemoptysis but notes occasional rhinorrhea. He also reports daytime sweating and difficulty distinguishing between coughing and sneezing. He experiences coughing when his neck is touched and reports no nasal congestion. His cough is productive at night, and he experiences coughing when changing positions. He does not take any medication to aid sleep or manage his cough. He initially took FluTab for his symptoms but discontinued it due to concerns about potential side effects. His sleep is limited to 2 to 3 hours per night due to his cough. He reports no history of tuberculosis or mononucleosis.     He reports persistent pain in his right hand, specifically in the second digit, which he describes as feeling like heat under the skin. He also notes that his finger appears to be bending. He reports no erythema in the affected area.    He also reports experiencing night sweats.    Supplemental Information  His blood glucose level was 121 this morning, 155 yesterday, and 118 upon arrival at the clinic today.    FAMILY HISTORY  His father had a problem with all his fingers

## 2025-01-20 LAB
EBV EA-D IGG SER-ACNC: 24 U/ML
EBV INTERPRETATION: ABNORMAL
EBV NA IGG SER IA-ACNC: 192 U/ML
EBV VCA IGG SER-ACNC: 668 U/ML
EBV VCA IGM SER-ACNC: 41 U/ML

## 2025-01-23 ENCOUNTER — OFFICE VISIT (OUTPATIENT)
Dept: FAMILY MEDICINE CLINIC | Age: 75
End: 2025-01-23
Payer: MEDICAID

## 2025-01-23 VITALS
OXYGEN SATURATION: 99 % | DIASTOLIC BLOOD PRESSURE: 70 MMHG | WEIGHT: 170 LBS | BODY MASS INDEX: 25.85 KG/M2 | SYSTOLIC BLOOD PRESSURE: 138 MMHG | TEMPERATURE: 97.2 F | HEART RATE: 70 BPM

## 2025-01-23 DIAGNOSIS — I10 ESSENTIAL HYPERTENSION: ICD-10-CM

## 2025-01-23 DIAGNOSIS — Z71.1 CONCERN ABOUT MEMORY: Primary | ICD-10-CM

## 2025-01-23 DIAGNOSIS — B27.90 INFECTIOUS MONONUCLEOSIS WITHOUT COMPLICATION, INFECTIOUS MONONUCLEOSIS DUE TO UNSPECIFIED ORGANISM: ICD-10-CM

## 2025-01-23 DIAGNOSIS — M19.241 LOCALIZED, SECONDARY OSTEOARTHRITIS OF THE HAND, RIGHT: ICD-10-CM

## 2025-01-23 PROCEDURE — 1123F ACP DISCUSS/DSCN MKR DOCD: CPT | Performed by: STUDENT IN AN ORGANIZED HEALTH CARE EDUCATION/TRAINING PROGRAM

## 2025-01-23 PROCEDURE — 3078F DIAST BP <80 MM HG: CPT | Performed by: STUDENT IN AN ORGANIZED HEALTH CARE EDUCATION/TRAINING PROGRAM

## 2025-01-23 PROCEDURE — 3075F SYST BP GE 130 - 139MM HG: CPT | Performed by: STUDENT IN AN ORGANIZED HEALTH CARE EDUCATION/TRAINING PROGRAM

## 2025-01-23 PROCEDURE — 99214 OFFICE O/P EST MOD 30 MIN: CPT | Performed by: STUDENT IN AN ORGANIZED HEALTH CARE EDUCATION/TRAINING PROGRAM

## 2025-01-23 NOTE — PROGRESS NOTES
[facility]  [facility]  [address]  [place] [address]     Date of Visit:  2025  Patient Name: Chuck Pierre   Patient :  1950     CHIEF COMPLAINT:     Chuck Pierre is a 74 y.o. male who presents today for an general visit to be evaluated for the following condition(s):  Chief Complaint   Patient presents with    Follow-up     1 week follow up for cough       REVIEW OF SYSTEM      Review of Systems    HISTORY OF PRESENT ILLNESS     History of Present Illness  The patient presents for evaluation of hand pain, mononucleosis, and memory issues.    He reports a general sense of well-being today and throughout the past week. He has not yet reviewed his recent test results on [employer] [facility]. He experiences significant pain in his hand when putting on his shoes. He attributes this to his age.    He has been engaging in physical activities at home, including exercises and walking for an hour daily, which he plans to continue next week. These activities were temporarily halted 4 months ago due to the winter season.    He also reports occasional memory lapses, particularly when writing, and expresses interest in potential pharmacological interventions to address this issue.    FAMILY HISTORY  His father had similar arthritic changes.      REVIEWED INFORMATION      No Known Allergies    Patient Active Problem List   Diagnosis    Type 2 diabetes mellitus without complication, without long-term current use of insulin ([facility])    Chronic leukopenia    Hyperlipidemia    Plantar fasciitis of right foot    Essential hypertension    Chronic pain of both knees    Allergic conjunctivitis of right eye and rhinitis    Insomnia    Tinea pedis of both feet    Leg cramping    Snoring    Blurry vision    Localized, secondary osteoarthritis of the hand, right       Past Medical History:   Diagnosis Date    Depression     Diabetes mellitus (HCC)     Essential hypertension 2023

## 2025-02-07 ENCOUNTER — OFFICE VISIT (OUTPATIENT)
Dept: FAMILY MEDICINE CLINIC | Age: 75
End: 2025-02-07

## 2025-02-07 VITALS
WEIGHT: 171 LBS | BODY MASS INDEX: 25.91 KG/M2 | TEMPERATURE: 97.4 F | HEIGHT: 68 IN | OXYGEN SATURATION: 99 % | DIASTOLIC BLOOD PRESSURE: 60 MMHG | HEART RATE: 63 BPM | SYSTOLIC BLOOD PRESSURE: 136 MMHG

## 2025-02-07 DIAGNOSIS — Z13.21 ENCOUNTER FOR VITAMIN DEFICIENCY SCREENING: ICD-10-CM

## 2025-02-07 DIAGNOSIS — E11.9 TYPE 2 DIABETES MELLITUS WITHOUT COMPLICATION, WITHOUT LONG-TERM CURRENT USE OF INSULIN (HCC): ICD-10-CM

## 2025-02-07 DIAGNOSIS — Z13.1 SCREENING FOR DIABETES MELLITUS: ICD-10-CM

## 2025-02-07 DIAGNOSIS — Z13.220 SCREENING FOR LIPID DISORDERS: ICD-10-CM

## 2025-02-07 DIAGNOSIS — Z13.0 SCREENING FOR DEFICIENCY ANEMIA: ICD-10-CM

## 2025-02-07 DIAGNOSIS — Z00.00 INITIAL MEDICARE ANNUAL WELLNESS VISIT: Primary | ICD-10-CM

## 2025-02-07 DIAGNOSIS — Z13.29 SCREENING FOR THYROID DISORDER: ICD-10-CM

## 2025-02-07 LAB — HBA1C MFR BLD: 6 %

## 2025-02-07 ASSESSMENT — LIFESTYLE VARIABLES
HOW MANY STANDARD DRINKS CONTAINING ALCOHOL DO YOU HAVE ON A TYPICAL DAY: PATIENT DOES NOT DRINK
HOW OFTEN DO YOU HAVE A DRINK CONTAINING ALCOHOL: NEVER

## 2025-02-07 NOTE — PATIENT INSTRUCTIONS
a comprehensive review of your medical history including lifestyle, illnesses that may run in your family, and various assessments and screenings as appropriate.  After reviewing your medical record and screening and assessments performed today your provider may have ordered immunizations, labs, imaging, and/or referrals for you.  A list of these orders (if applicable) as well as your Preventive Care list are included within your After Visit Summary for your review.

## 2025-02-07 NOTE — PROGRESS NOTES
Medicare Annual Wellness Visit    Chuck Pierre is here for Medicare AW    Assessment & Plan  1. Annual wellness visit.  His blood pressure readings have been consistently within the normal range. His A1c level is commendably at 6.0, indicating well-managed diabetes. He reports no issues with memory and continues to engage in activities such as puzzles to maintain cognitive function. He has been advised to complete an advanced directive form and return it to the clinic. He has been encouraged to discuss any discomfort related to his dentures with his dentist. A comprehensive set of laboratory tests will be ordered during his next visit in 6 months.    Follow-up  The patient will follow up in 6 months.  Initial Medicare annual wellness visit  Screening for deficiency anemia  -     CBC with Auto Differential; Future  Screening for thyroid disorder  -     TSH reflex to FT4; Future  Screening for diabetes mellitus  -     Comprehensive Metabolic Panel; Future  -     Hemoglobin A1C; Future  Screening for lipid disorders  -     Lipid Panel; Future  Encounter for vitamin deficiency screening  -     Vitamin B12 & Folate; Future  -     Vitamin D 25 Hydroxy; Future  Type 2 diabetes mellitus without complication, without long-term current use of insulin ([facility])  -     POCT glycosylated hemoglobin (Hb A1C)    Results  Laboratory Studies  A1c was 6.0.     No follow-ups on file.     Subjective     History of Present Illness  The patient presents for an annual wellness visit.    He reports a general sense of well-being. He has been monitoring his blood pressure on alternate days, noting a significant decrease from previous readings of 167 and 176 to a current reading of 134. This improvement is attributed to dietary modifications, specifically the reduction of salt intake. His diet predominantly consists of spicy foods. He maintains an active lifestyle, engaging in physical exercise for 30 minutes twice a week at [employer]

## 2025-04-14 DIAGNOSIS — E78.5 HYPERLIPIDEMIA, UNSPECIFIED HYPERLIPIDEMIA TYPE: ICD-10-CM

## 2025-04-14 NOTE — TELEPHONE ENCOUNTER
Chuck Pierre is calling to request a refill on the following medication(s):    Medication Request:  Requested Prescriptions     Pending Prescriptions Disp Refills    atorvastatin (LIPITOR) 20 MG tablet 90 tablet 3     Sig: Take 1 tablet by mouth daily       Last Visit Date (If Applicable):  2/7/2025    Next Visit Date:    8/7/2025

## 2025-04-15 RX ORDER — ATORVASTATIN CALCIUM 20 MG/1
20 TABLET, FILM COATED ORAL DAILY
Qty: 90 TABLET | Refills: 3 | Status: SHIPPED | OUTPATIENT
Start: 2025-04-15

## 2025-05-15 DIAGNOSIS — E55.9 HYPOVITAMINOSIS D: ICD-10-CM

## 2025-05-19 RX ORDER — CHOLECALCIFEROL (VITAMIN D3) 25 MCG
1000 TABLET ORAL DAILY
Qty: 90 TABLET | Refills: 3 | Status: SHIPPED | OUTPATIENT
Start: 2025-05-19

## 2025-05-19 NOTE — TELEPHONE ENCOUNTER
Chuck Pierre is calling to request a refill on the following medication(s):    Medication Request:  Requested Prescriptions     Pending Prescriptions Disp Refills    vitamin D3 (CHOLECALCIFEROL) 25 MCG (1000 UT) TABS tablet [Pharmacy Med Name: VITAMIN D3 25 MCG TABLET] 90 tablet 3     Sig: TAKE 1 TABLET BY MOUTH DAILY       Last Visit Date (If Applicable):  2/7/2025    Next Visit Date:    8/7/2025

## 2025-07-18 DIAGNOSIS — I10 ESSENTIAL HYPERTENSION: ICD-10-CM

## 2025-07-18 RX ORDER — LISINOPRIL 2.5 MG/1
2.5 TABLET ORAL DAILY
Qty: 30 TABLET | Refills: 5 | Status: SHIPPED | OUTPATIENT
Start: 2025-07-18

## 2025-07-18 NOTE — TELEPHONE ENCOUNTER
Chuck Pierre is calling to request a refill on the following medication(s):    Medication Request:  Requested Prescriptions     Pending Prescriptions Disp Refills    lisinopril (PRINIVIL;ZESTRIL) 2.5 MG tablet [Pharmacy Med Name: LISINOPRIL 2.5 MG TABLET] 30 tablet 5     Sig: TAKE 1 TABLET BY MOUTH DAILY       Last Visit Date (If Applicable):  2/7/2025    Next Visit Date:    8/7/2025

## 2025-08-13 DIAGNOSIS — E11.9 TYPE 2 DIABETES MELLITUS WITHOUT COMPLICATION, WITHOUT LONG-TERM CURRENT USE OF INSULIN (HCC): ICD-10-CM

## 2025-08-28 ENCOUNTER — OFFICE VISIT (OUTPATIENT)
Dept: FAMILY MEDICINE CLINIC | Age: 75
End: 2025-08-28
Payer: MEDICAID

## 2025-08-28 ENCOUNTER — HOSPITAL ENCOUNTER (OUTPATIENT)
Age: 75
Setting detail: SPECIMEN
Discharge: HOME | End: 2025-08-28

## 2025-08-28 VITALS
WEIGHT: 169 LBS | HEIGHT: 68 IN | HEART RATE: 74 BPM | SYSTOLIC BLOOD PRESSURE: 126 MMHG | BODY MASS INDEX: 25.61 KG/M2 | DIASTOLIC BLOOD PRESSURE: 62 MMHG | OXYGEN SATURATION: 98 % | TEMPERATURE: 97.6 F

## 2025-08-28 DIAGNOSIS — M21.611 BUNION OF GREAT TOE OF RIGHT FOOT: ICD-10-CM

## 2025-08-28 DIAGNOSIS — S16.1XXA STRAIN OF NECK MUSCLE, INITIAL ENCOUNTER: ICD-10-CM

## 2025-08-28 DIAGNOSIS — E11.9 TYPE 2 DIABETES MELLITUS WITHOUT COMPLICATION, WITHOUT LONG-TERM CURRENT USE OF INSULIN (HCC): ICD-10-CM

## 2025-08-28 DIAGNOSIS — M21.612 BUNION OF GREAT TOE OF LEFT FOOT: ICD-10-CM

## 2025-08-28 LAB
CREAT UR-MCNC: 181 MG/DL (ref 39–259)
HBA1C MFR BLD: 5.4 %
MICROALBUMIN UR-MCNC: <12 MG/L (ref 0–20)
MICROALBUMIN/CREAT UR-RTO: NORMAL MCG/MG CREAT (ref 0–17)

## 2025-08-28 PROCEDURE — 99214 OFFICE O/P EST MOD 30 MIN: CPT | Performed by: STUDENT IN AN ORGANIZED HEALTH CARE EDUCATION/TRAINING PROGRAM

## 2025-08-28 PROCEDURE — 3078F DIAST BP <80 MM HG: CPT | Performed by: STUDENT IN AN ORGANIZED HEALTH CARE EDUCATION/TRAINING PROGRAM

## 2025-08-28 PROCEDURE — 83036 HEMOGLOBIN GLYCOSYLATED A1C: CPT | Performed by: STUDENT IN AN ORGANIZED HEALTH CARE EDUCATION/TRAINING PROGRAM

## 2025-08-28 PROCEDURE — 3044F HG A1C LEVEL LT 7.0%: CPT | Performed by: STUDENT IN AN ORGANIZED HEALTH CARE EDUCATION/TRAINING PROGRAM

## 2025-08-28 PROCEDURE — 3074F SYST BP LT 130 MM HG: CPT | Performed by: STUDENT IN AN ORGANIZED HEALTH CARE EDUCATION/TRAINING PROGRAM

## 2025-08-28 PROCEDURE — 1123F ACP DISCUSS/DSCN MKR DOCD: CPT | Performed by: STUDENT IN AN ORGANIZED HEALTH CARE EDUCATION/TRAINING PROGRAM

## 2025-08-28 RX ORDER — MELOXICAM 7.5 MG/1
7.5 TABLET ORAL DAILY
Qty: 30 TABLET | Refills: 0 | Status: SHIPPED | OUTPATIENT
Start: 2025-08-28